# Patient Record
Sex: FEMALE | Race: WHITE | NOT HISPANIC OR LATINO | URBAN - METROPOLITAN AREA
[De-identification: names, ages, dates, MRNs, and addresses within clinical notes are randomized per-mention and may not be internally consistent; named-entity substitution may affect disease eponyms.]

---

## 2019-04-10 ENCOUNTER — INPATIENT (INPATIENT)
Facility: HOSPITAL | Age: 59
LOS: 0 days | Discharge: HOME | DRG: 641 | End: 2019-04-11
Payer: COMMERCIAL

## 2019-04-10 VITALS
RESPIRATION RATE: 18 BRPM | HEART RATE: 88 BPM | WEIGHT: 130.07 LBS | DIASTOLIC BLOOD PRESSURE: 84 MMHG | TEMPERATURE: 98 F | SYSTOLIC BLOOD PRESSURE: 149 MMHG | HEIGHT: 65 IN | OXYGEN SATURATION: 100 %

## 2019-04-10 DIAGNOSIS — R74.0 NONSPECIFIC ELEVATION OF LEVELS OF TRANSAMINASE AND LACTIC ACID DEHYDROGENASE [LDH]: ICD-10-CM

## 2019-04-10 DIAGNOSIS — R63.8 OTHER SYMPTOMS AND SIGNS CONCERNING FOOD AND FLUID INTAKE: ICD-10-CM

## 2019-04-10 DIAGNOSIS — Z98.82 BREAST IMPLANT STATUS: Chronic | ICD-10-CM

## 2019-04-10 DIAGNOSIS — R19.7 DIARRHEA, UNSPECIFIED: ICD-10-CM

## 2019-04-10 DIAGNOSIS — I10 ESSENTIAL (PRIMARY) HYPERTENSION: ICD-10-CM

## 2019-04-10 DIAGNOSIS — L03.115 CELLULITIS OF RIGHT LOWER LIMB: ICD-10-CM

## 2019-04-10 DIAGNOSIS — Z91.89 OTHER SPECIFIED PERSONAL RISK FACTORS, NOT ELSEWHERE CLASSIFIED: ICD-10-CM

## 2019-04-10 DIAGNOSIS — E87.1 HYPO-OSMOLALITY AND HYPONATREMIA: ICD-10-CM

## 2019-04-10 LAB
ANION GAP SERPL CALC-SCNC: 8 MMOL/L — SIGNIFICANT CHANGE UP (ref 5–17)
ANION GAP SERPL CALC-SCNC: 8 MMOL/L — SIGNIFICANT CHANGE UP (ref 5–17)
BUN SERPL-MCNC: 10 MG/DL — SIGNIFICANT CHANGE UP (ref 7–23)
BUN SERPL-MCNC: 13 MG/DL — SIGNIFICANT CHANGE UP (ref 7–23)
CALCIUM SERPL-MCNC: 9.3 MG/DL — SIGNIFICANT CHANGE UP (ref 8.4–10.5)
CALCIUM SERPL-MCNC: 9.3 MG/DL — SIGNIFICANT CHANGE UP (ref 8.4–10.5)
CHLORIDE SERPL-SCNC: 91 MMOL/L — LOW (ref 96–108)
CHLORIDE SERPL-SCNC: 97 MMOL/L — SIGNIFICANT CHANGE UP (ref 96–108)
CO2 SERPL-SCNC: 24 MMOL/L — SIGNIFICANT CHANGE UP (ref 22–31)
CO2 SERPL-SCNC: 26 MMOL/L — SIGNIFICANT CHANGE UP (ref 22–31)
CREAT SERPL-MCNC: 0.76 MG/DL — SIGNIFICANT CHANGE UP (ref 0.5–1.3)
CREAT SERPL-MCNC: 0.8 MG/DL — SIGNIFICANT CHANGE UP (ref 0.5–1.3)
GLUCOSE SERPL-MCNC: 105 MG/DL — HIGH (ref 70–99)
GLUCOSE SERPL-MCNC: 149 MG/DL — HIGH (ref 70–99)
HBA1C BLD-MCNC: 5 % — SIGNIFICANT CHANGE UP (ref 4–5.6)
OSMOLALITY SERPL: 255 MOSM/KG — LOW (ref 280–301)
POTASSIUM SERPL-MCNC: 4 MMOL/L — SIGNIFICANT CHANGE UP (ref 3.5–5.3)
POTASSIUM SERPL-MCNC: 4.3 MMOL/L — SIGNIFICANT CHANGE UP (ref 3.5–5.3)
POTASSIUM SERPL-SCNC: 4 MMOL/L — SIGNIFICANT CHANGE UP (ref 3.5–5.3)
POTASSIUM SERPL-SCNC: 4.3 MMOL/L — SIGNIFICANT CHANGE UP (ref 3.5–5.3)
SODIUM SERPL-SCNC: 125 MMOL/L — LOW (ref 135–145)
SODIUM SERPL-SCNC: 129 MMOL/L — LOW (ref 135–145)
SODIUM UR-SCNC: <20 MMOL/L — SIGNIFICANT CHANGE UP

## 2019-04-10 PROCEDURE — 99223 1ST HOSP IP/OBS HIGH 75: CPT | Mod: GC

## 2019-04-10 PROCEDURE — 93010 ELECTROCARDIOGRAM REPORT: CPT

## 2019-04-10 PROCEDURE — 99284 EMERGENCY DEPT VISIT MOD MDM: CPT | Mod: 25

## 2019-04-10 RX ORDER — TRAZODONE HCL 50 MG
150 TABLET ORAL AT BEDTIME
Qty: 0 | Refills: 0 | Status: DISCONTINUED | OUTPATIENT
Start: 2019-04-10 | End: 2019-04-11

## 2019-04-10 RX ORDER — SODIUM CHLORIDE 9 MG/ML
1000 INJECTION INTRAMUSCULAR; INTRAVENOUS; SUBCUTANEOUS ONCE
Qty: 0 | Refills: 0 | Status: COMPLETED | OUTPATIENT
Start: 2019-04-10 | End: 2019-04-10

## 2019-04-10 RX ORDER — SODIUM CHLORIDE 9 MG/ML
1000 INJECTION INTRAMUSCULAR; INTRAVENOUS; SUBCUTANEOUS
Qty: 0 | Refills: 0 | Status: DISCONTINUED | OUTPATIENT
Start: 2019-04-10 | End: 2019-04-10

## 2019-04-10 RX ORDER — LANOLIN ALCOHOL/MO/W.PET/CERES
3 CREAM (GRAM) TOPICAL AT BEDTIME
Qty: 0 | Refills: 0 | Status: DISCONTINUED | OUTPATIENT
Start: 2019-04-10 | End: 2019-04-11

## 2019-04-10 RX ORDER — SODIUM CHLORIDE 9 MG/ML
1000 INJECTION INTRAMUSCULAR; INTRAVENOUS; SUBCUTANEOUS
Qty: 0 | Refills: 0 | Status: DISCONTINUED | OUTPATIENT
Start: 2019-04-10 | End: 2019-04-11

## 2019-04-10 RX ADMIN — SODIUM CHLORIDE 100 MILLILITER(S): 9 INJECTION INTRAMUSCULAR; INTRAVENOUS; SUBCUTANEOUS at 19:08

## 2019-04-10 RX ADMIN — SODIUM CHLORIDE 1000 MILLILITER(S): 9 INJECTION INTRAMUSCULAR; INTRAVENOUS; SUBCUTANEOUS at 12:22

## 2019-04-10 RX ADMIN — Medication 150 MILLIGRAM(S): at 23:52

## 2019-04-10 RX ADMIN — Medication 1 TABLET(S): at 18:44

## 2019-04-10 NOTE — ED ADULT NURSE NOTE - CHIEF COMPLAINT QUOTE
Patient was sent by PMD for low sodium level , went to PMD yesterday for her rt foot infected toe , had blood work done . Denies any fever nor chills .

## 2019-04-10 NOTE — H&P ADULT - ATTENDING COMMENTS
Pt. seen and examined by me earlier today; I have read Dr. Obrien's H&P, I agree w/ his findings and plan of care as documented; Pt. feels well, reports diarrhea is resolving; she also reports some increased water/fluid intake recently; she denies F/C, N/V, HA, lethargy, dizziness, confusion; VSS; Pt. well-appearing on exam, MMM, no JVD, skin WWP, R foot w/ area of erythema and edema, non-focal neuro exam; labs reviewed; Na 122 --> 125 (s/p NS), Sosm 255   (1) Hyponatremia -- asymptomatic; hypo-osmolar, hypovolemic (likely d/t diarrhea, in setting of increased water intake); Na improving w/ NS; monitor BMP; Renal consult if worsening or if over-corrected  (2) R foot cellulitis -- infected bunion, per Pt.; cont. TMP-SMX for now, elevate foot, Podiatry consult in AM  (3) HTN -- cont. low-salt diet, can liberalize salt intake if hyponatremia not improving  (4) Diarrhea -- possibly antibiotic-associated; resolving, per Pt.; monitor for now, but will pursue further work-up if worsening, may need alternative abx if worsening   (5) Transaminitis -- possibly d/t infection and/or TMP-SMX; monitor LFTs, check viral hepatitis panel; may need alternative abx if worsening  Dispo: home pending improved [Na]

## 2019-04-10 NOTE — ED ADULT NURSE NOTE - CHPI ED NUR SYMPTOMS NEG
no chills/no vomiting/no decreased eating/drinking/no nausea/no fever/no headache/no loss of consciousness/no dizziness/no back pain

## 2019-04-10 NOTE — H&P ADULT - NSHPPHYSICALEXAM_GEN_ALL_CORE
GENERAL: Well-developed, well-nourished. No acute distress. Appears stated age.  HEENT:  Atraumatic, Normocephalic,  extraocular eye movements intact, PERRLA, conjunctiva and sclera clear, oropharynx clear without exudates or erythema  NECK: Supple, No JVD, no LAD  CHEST: no gross deformities   LUNG: Clear to auscultation bilaterally; No wheezing, rales, rhonchi, or stridor  HEART: Regular rate and rhythm; S1 and S2 audible; No murmurs, rubs, or gallops  ABDOMEN: Soft, Nontender, Nondistended; Bowel sounds present in all four quadrants; no hepatosplenomegaly  EXTREMITIES:  2+ Peripheral Pulses bilaterally, No clubbing, cyanosis, or edema  NEUROLOGY: awake, alert, oriented x3; no focal deficits   SKIN: No rashes or lesions GENERAL: Well-developed, well-nourished. No acute distress. Appears stated age.  HEENT:  EOMI, PERRLA, conjunctiva and sclera clear, oropharynx clear, dry mucous membranes  NECK: no JVD, no LAD  LUNG: Clear to auscultation bilaterally; No wheezing, rales, rhonchi, or stridor  HEART: Regular rate and rhythm; S1 and S2 audible; No murmurs, rubs, or gallops  ABDOMEN: soft, NTND; BS+  EXTREMITIES:  2+ Peripheral Pulses bilaterally. On right foot, ~5x5cm area of erythema surrounding 1st MTP joint, warm to touch, very mild tenderness to palpation. No area of fluctuance underneath. Able to move toes without restricted ROM. GENERAL: Well-developed, well-nourished. No acute distress. Appears stated age.  HEENT:  EOMI, PERRLA, conjunctiva and sclera clear, oropharynx clear, dry mucous membranes  NECK: no JVD, no LAD  LUNG: Clear to auscultation bilaterally; No wheezing, rales, rhonchi, or stridor  HEART: Regular rate and rhythm; S1 and S2 audible; No murmurs, rubs, or gallops  ABDOMEN: soft, NT, ND; BS+ in 4 quadrants  EXTREMITIES:  2+ Peripheral Pulses bilaterally. On right foot, ~5x5cm area of swelling and erythema on medial side just proximal 1st MTP joint, warm to touch, very mild tenderness to palpation. No area of fluctuance underneath. No open wound and no purulent drainage. Able to move toes without restricted ROM.

## 2019-04-10 NOTE — H&P ADULT - PROBLEM SELECTOR PLAN 1
Pt is a 59 y/o woman with PMHx of HTN, anxiety, and infected bunion on R foot who was sent in by her PCP for asymptomatic hyponatremia in the context of diarrhea after recent antibiotic use. PE showed dry mucous membranes. /84. Labs significant for hyponatremia 122, hypoK 3.4, hypoCl 82, serum Osm 255. Urine osm 189, urine Na < 20. Likely hypovolemic hyponatremia due to GI losses. S/p 500mL NS in the ED with improvement of Na to 125. Currently asymptomatic, hemodynamically stable.  - Continue with IVF  - Trend Na levels  - Replete electrolytes PRN Na 122, serum Osm 255. Urine osm 189, urine Na < 20. Studies and exam consistent with hypovolemic hypotonic hyponatremia due to GI losses from recent diarrhea. Unlikely from SIADH or polydipsia.  S/p 500mL NS in the ED with improvement of Na to 125.  Asymptomatic on admission.  - Continue with IV NS @100 mL/hr  - Trend BMPs   -Goal correction rate: 8-12 mEq in 24 hrs.  - Replete electrolytes PRN Na 122, serum Osm 255. Urine osm 189, urine Na < 20. Studies and exam consistent with hypovolemic hypo-osmolar hyponatremia due to GI losses from recent diarrhea. Unlikely from SIADH or polydipsia.  S/p 500mL NS in the ED with improvement of Na to 125.  Asymptomatic on admission.  - Continue with IV NS @100 mL/hr  - Trend BMPs   -Goal correction rate: 8-12 mEq in 24 hrs.  - Replete electrolytes PRN  - Renal consult if worsening or significant overcorrection on fluids

## 2019-04-10 NOTE — ED PROVIDER NOTE - OBJECTIVE STATEMENT
59 y/o f sent to ED for hyponatremia found as oupt.  As per pt she is currently being treated for right 1st toe infection on bactrim for two days.  Pt called this morning after labs revealed a hyponatremia.  Pt states she had diarrhea infection followed by drinking a lot of water for past two days.  She describes multiple 2L bottles of water per day.  Denies nausea, vomiting, confusion.  Pt has had hyponatremia in past when on HCTZ but has no longer been on this medication.

## 2019-04-10 NOTE — H&P ADULT - PROBLEM SELECTOR PLAN 4
Pt with hx of HTN, on metoprolol at home (unsure of dose). On admission, / 84.  - Hold home metoprolol for now On admission, labs significant for elevated , , Alk phos and Tbili WNL. Denies any illicit drug use, and only social alcohol. No history of liver disease. She has been taking Bactrim prior to admission which may rarely lead to liver injury.  - Obtain acute hepatitis panel  - Monitor AST, ALT with daily labs; if rising may need to d/c Bactrim On admission, labs significant for elevated , , Alk phos and Tbili WNL. Denies any illicit drug use, and only social alcohol. No history of liver disease. She has been taking Bactrim prior to admission which may rarely lead to liver injury.  - Obtain acute hepatitis panel  - Monitor AST, ALT with daily labs off Bactirm

## 2019-04-10 NOTE — ED PROVIDER NOTE - CLINICAL SUMMARY MEDICAL DECISION MAKING FREE TEXT BOX
Pt with right lower ext first toe redness - found to have hyponatremia - ? hypovolemia vs polydypsia. Pt with improvement after bolus of 500cc NS.  Pt to be admitted to medicine for further mgmt of hyponatremia until correction and also to discern underlying cause.

## 2019-04-10 NOTE — ED ADULT NURSE NOTE - OBJECTIVE STATEMENT
Patient alert and oriented x 3 was sent by PMD for low sodium level . Pt . went to PMD yesterday due to rt foot infection for few days and had blood work done . Today PMD called her to come to ER for low na level and she can have possible seizure . having rt foot pain at this time .

## 2019-04-10 NOTE — H&P ADULT - PROBLEM SELECTOR PLAN 5
Pt with hx of HTN, on metoprolol at home (unsure of dose). On admission, / 84.  - Hold home metoprolol for now as patient normotensive

## 2019-04-10 NOTE — H&P ADULT - NSHPSOCIALHISTORY_GEN_ALL_CORE
Recently moved to [place], employed, lives in an apartment. Does not smoke or use illicit drugs. States social ETOH use on weekends. Recently moved to [place], employed, lives in an apartment.     Denies smoking or illicit drug use. States social ETOH use on weekends.

## 2019-04-10 NOTE — H&P ADULT - NSHPLABSRESULTS_GEN_ALL_CORE
12.2   9.45  )-----------( 173      ( 10 Apr 2019 11:23 )             34.8     04-10    125<L>  |  91<L>  |  10  ----------------------------<  149<H>  4.0   |  26  |  0.76    Ca    9.3      10 Apr 2019 13:41    TPro  7.7  /  Alb  4.9  /  TBili  0.8  /  DBili  x   /  AST  127<H>  /  ALT  109<H>  /  AlkPhos  75  04-10

## 2019-04-10 NOTE — H&P ADULT - ASSESSMENT
Pt is a 59 y/o woman with PMHx of HTN, anxiety, and infected bunion on R foot who was sent in by her PCP for asymptomatic hyponatremia in the context of diarrhea after recent antibiotic use. Labs significant for hyponatremia 122 (improvement to 125 s/p 500mL NS), hypoK 3.4, hypoCl 82, serum Osm 255. Urine osm 189, urine Na < 20. Likely hypovolemic hyponatremia due to GI losses. 59 y/o female with HTN, anxiety, and infected bunion on R foot who was sent in by outpatient internist for hyponatremia the in setting of diarrhea after recent antibiotic use. Found to hyponatremic to 122, but asymptomatic on admission. Studies and exam suggesting hypovolemic hypotonic hyponatremia due to GI losses. 59 y/o female with HTN, anxiety, and infected bunion on R foot who was sent in by outpatient internist for hyponatremia the in setting of diarrhea after recent antibiotic use. Found to hyponatremic to 122, but asymptomatic on admission. Studies and exam suggesting hypovolemic hypo-osmolar hyponatremia due to GI losses.

## 2019-04-10 NOTE — H&P ADULT - PROBLEM SELECTOR PLAN 6
F: IV NS @ 100 mL/hr; adjust rate based on Na correction  E: monitor and correct Na as above  N: DASH/TLC diet     Code: full  DVT ppx: low risk, no pharmacologic prophylaxis needed  GI ppx: not needed  Dispo: [facility]

## 2019-04-10 NOTE — H&P ADULT - PROBLEM SELECTOR PLAN 3
On admission, labs significant for elevated , , normal alk phos. No history of IVDU, social ETOH use. Unaware of any hx of hepatitis. She has been taking Bactrim prior to admission, which has been noted to cause elevated transaminases too.  - Obtain hepatitis panel  - Monitor AST, ALT Likely due to antibiotic side effect, and less likely infectious given patient afebrile and no leukocytosis.   -Continue to monitor diarrhea with Is and Os. If worsening, will need to d/c Bactrim and pursue infectious diarrhea work-up. Likely due to antibiotic side effect, and less likely infectious given patient afebrile and no leukocytosis.   -Continue to monitor diarrhea with Is and Os. If worsening, will need to adjust antibiotics pursue infectious diarrhea work-up.

## 2019-04-10 NOTE — H&P ADULT - HISTORY OF PRESENT ILLNESS
Pt is a 59 y/o woman with PMHx of HTN, anxiety, and infected bunion on R foot who was sent in by her PCP for asymptomatic hyponatremia a/w diarrhea after antibiotic use. Pt states that the bunion on her R foot had been bothering her since Sept 2018, saw multiple podiatrists, and had been receiving cortisone shots. Her most recent cortisone shot was this Feb, after which she noticed the area of injection appeared slightly more red, swollen and painful. She attributed the redness to a sunburn after a trip to [place], but felt that the area continued to become more red and painful with some pus drainage. She went to urgent care on March 22nd given worsening symptoms, and was prescribed an antibiotic (does not recall name) for likely cellulitis. She was on it for 2-3 days but developed diarrhea, so she was then switched to Bactrim. She developed diarrhea again so she discontinued the antibiotic and returned to urgent care on 4/8. Stools have been primarily brown liquid, occurs whenever she goes to urinate. Since she began having diarrhea she has been increasing her water intake as well, drinking multiple bottles of water, juices, and sodas per day. Urinary frequency increased as well, but denies any dysuria. She was subsequently referred by urgent care to a podiatrist and internist for further management. Lab work done showed she was hyponatremic, so she was subsequently sent to the ED today. Pt denies any fevers or chills, HA and visual changes, N/V, SOB, CP, abdominal pain.     In the ED, VS T 36.9, , /84, RR 16, 97% on RA. Lab work significant for Na 122, K 3.4, Cl 82, serum Osm 255. Urine studies ordered, osm 189, urine Na < 20. Received 500mL NS and admitted for further management. 59 y/o female with PMHx of HTN, anxiety, and infected bunion on R foot who was sent in by her PCP for asymptomatic hyponatremia a/w diarrhea after antibiotic use. Pt states that the bunion on her R foot had been bothering her since Sept 2018, saw multiple podiatrists, and had been receiving cortisone shots. Her most recent cortisone shot was this Feb, after which she noticed the area of injection appeared slightly more red, swollen and painful. She attributed the redness to a sunburn after a trip to [place], but felt that the area continued to become more red and painful with some pus drainage. She went to urgent care on March 22nd given worsening symptoms, and was prescribed an antibiotic (does not recall name) for likely cellulitis. She was on it for 2-3 days but developed diarrhea, so she was then switched to Bactrim. She developed diarrhea again so she discontinued the antibiotic and returned to urgent care on 4/8. Stools have been primarily brown liquid, occurs whenever she goes to urinate. Since she began having diarrhea she has been increasing her water intake as well, drinking multiple bottles of water, juices, and sodas per day. Urinary frequency increased as well, but denies any dysuria. She was subsequently referred by urgent care to a podiatrist and internist for further management. Lab work done showed she was hyponatremic, so she was subsequently sent to the ED today. Pt denies any fevers or chills, HA and visual changes, N/V, SOB, CP, abdominal pain.     In the ED, VS T 36.9, , /84, RR 16, 97% on RA. Lab work significant for Na 122, K 3.4, Cl 82, serum Osm 255. Urine studies ordered, osm 189, urine Na < 20. Received 500mL NS and admitted for further management. 57 y/o female with HTN, anxiety, and infected bunion on R foot who was sent in by outpatient internist for hyponatremia.  Pt states that the bunion on her R foot had been bothering her since Sept 2018, saw multiple podiatrists, and had been receiving cortisone shots. Her most recent cortisone shot was this Feb, after which she noticed the area of injection appeared slightly more red, swollen and painful. She attributed the redness to a sunburn after a trip to [place], but felt that the area continued to become more red and painful with some pus drainage. She went to urgent care on March 22nd given worsening symptoms, and was prescribed an antibiotic (does not recall name) for likely cellulitis. She was on it for 2-3 days but developed severe diarrhea, so she was then switched to Bactrim DS BID. In total she has taken about 5 doses. She developed diarrhea again so she discontinued the antibiotics and returned to urgent care on 4/8. Stools have been primarily brown liquid, occurs whenever she goes to urinate, and not foul-smelling. Since she began having diarrhea she has been increasing her water intake as well, drinking multiple bottles of water, juices, and sodas for total of about 4-6 bottles per day. Urinary frequency increased as well, but denies any dysuria. She was subsequently referred by urgent care to a podiatrist and internist for further management. Lab work done showed she was hyponatremic, so she was subsequently sent to the ED today. Pt denies any fevers or chills, HA and visual changes, N/V, SOB, CP, abdominal pain.     In the ED, VS T 36.7, HR 88, /84, RR 18, SpO2 100 % on RA. Lab work significant for Na 122, K 3.4, Cl 82, serum Osm 255. Urine studies ordered, osm 189, urine Na < 20. Received 500mL NS bolus with correction of Na to 155. EKG with NSR, no acute ST abnormalities.

## 2019-04-10 NOTE — H&P ADULT - PROBLEM SELECTOR PLAN 2
Pt is a 57 y/o woman with PMHx of HTN, anxiety, and infected bunion on R foot who was evaluated by outpatient podiatry and urgent care. Started on antibiotics as an outpatient, subsequently developing diarrhea. Switched to Bactrim, with continued diarrhea and GI upset. Area of erythema on R foot, no fluctuance, likely cellulitis. Diarrhea is likely side effect from antibiotic use, unlikely primary infectious cause since she is afebrile, no leukocytosis.   - Continue Bactrim, given some improvement in erythema since starting it as an outpatient  - Start Imodium for symptomatic management of diarrhea Patient reporting purulent drainage from right foot several weeks ago, so increased risk for MRSA.  Patient recently switched to Bactrim DS BID by urgent care for right foot cellulitis, with worsening diarrhea and GI upset. Diarrhea is likely side effect from antibiotic use, unlikely primary infectious as patient with no signs of systemic infection (afebrile, no leukocytosis).  - Continue Bactrim DS Q12H, given some improvement in erythema since starting it as an outpatient. However, monitor I&O on Bactrim for increased diarrhea. If worsening, may need to switch to IV antibiotics.   -Podiatry consult in AM for further evaluation. Patient reporting purulent drainage from right foot several weeks ago, so increased risk for MRSA.  Patient recently switched to Bactrim DS BID by urgent care for right foot cellulitis, with worsening diarrhea and GI upset. Diarrhea is likely side effect from antibiotic use, unlikely primary infectious as patient with no signs of systemic infection (afebrile, no leukocytosis).  s/p Bactrim DS x 1 in ED.  -Switch to Doxycycline 100 mg BID, given that Bactrim may lead to hyponatremia and can rarely cause transaminitis.   Monitor for diarrhea while on doxycycline. If worsening, may need to switch to IV antibiotics.   -Podiatry consult in AM for further evaluation.

## 2019-04-11 VITALS
HEART RATE: 80 BPM | OXYGEN SATURATION: 98 % | DIASTOLIC BLOOD PRESSURE: 75 MMHG | TEMPERATURE: 98 F | SYSTOLIC BLOOD PRESSURE: 118 MMHG | RESPIRATION RATE: 18 BRPM

## 2019-04-11 DIAGNOSIS — Z91.89 OTHER SPECIFIED PERSONAL RISK FACTORS, NOT ELSEWHERE CLASSIFIED: ICD-10-CM

## 2019-04-11 LAB
ALBUMIN SERPL ELPH-MCNC: 4.1 G/DL — SIGNIFICANT CHANGE UP (ref 3.3–5)
ALP SERPL-CCNC: 63 U/L — SIGNIFICANT CHANGE UP (ref 40–120)
ALT FLD-CCNC: 97 U/L — HIGH (ref 10–45)
ANION GAP SERPL CALC-SCNC: 6 MMOL/L — SIGNIFICANT CHANGE UP (ref 5–17)
AST SERPL-CCNC: 99 U/L — HIGH (ref 10–40)
BILIRUB SERPL-MCNC: 0.7 MG/DL — SIGNIFICANT CHANGE UP (ref 0.2–1.2)
BUN SERPL-MCNC: 11 MG/DL — SIGNIFICANT CHANGE UP (ref 7–23)
CALCIUM SERPL-MCNC: 9.3 MG/DL — SIGNIFICANT CHANGE UP (ref 8.4–10.5)
CHLORIDE SERPL-SCNC: 101 MMOL/L — SIGNIFICANT CHANGE UP (ref 96–108)
CO2 SERPL-SCNC: 24 MMOL/L — SIGNIFICANT CHANGE UP (ref 22–31)
CREAT SERPL-MCNC: 0.78 MG/DL — SIGNIFICANT CHANGE UP (ref 0.5–1.3)
GLUCOSE SERPL-MCNC: 104 MG/DL — HIGH (ref 70–99)
HAV IGM SER-ACNC: SIGNIFICANT CHANGE UP
HBV CORE IGM SER-ACNC: SIGNIFICANT CHANGE UP
HBV SURFACE AG SER-ACNC: SIGNIFICANT CHANGE UP
HCT VFR BLD CALC: 33.3 % — LOW (ref 34.5–45)
HCV AB S/CO SERPL IA: 0.07 S/CO — SIGNIFICANT CHANGE UP
HCV AB SERPL-IMP: SIGNIFICANT CHANGE UP
HGB BLD-MCNC: 11.4 G/DL — LOW (ref 11.5–15.5)
MAGNESIUM SERPL-MCNC: 1.9 MG/DL — SIGNIFICANT CHANGE UP (ref 1.6–2.6)
MCHC RBC-ENTMCNC: 34.2 GM/DL — SIGNIFICANT CHANGE UP (ref 32–36)
MCHC RBC-ENTMCNC: 34.4 PG — HIGH (ref 27–34)
MCV RBC AUTO: 100.6 FL — HIGH (ref 80–100)
NRBC # BLD: 0 /100 WBCS — SIGNIFICANT CHANGE UP (ref 0–0)
PHOSPHATE SERPL-MCNC: 3.9 MG/DL — SIGNIFICANT CHANGE UP (ref 2.5–4.5)
PLATELET # BLD AUTO: 147 K/UL — LOW (ref 150–400)
POTASSIUM SERPL-MCNC: 4.1 MMOL/L — SIGNIFICANT CHANGE UP (ref 3.5–5.3)
POTASSIUM SERPL-SCNC: 4.1 MMOL/L — SIGNIFICANT CHANGE UP (ref 3.5–5.3)
PROT SERPL-MCNC: 6.6 G/DL — SIGNIFICANT CHANGE UP (ref 6–8.3)
RBC # BLD: 3.31 M/UL — LOW (ref 3.8–5.2)
RBC # FLD: 12.9 % — SIGNIFICANT CHANGE UP (ref 10.3–14.5)
SODIUM SERPL-SCNC: 131 MMOL/L — LOW (ref 135–145)
TSH SERPL-MCNC: 2.86 UIU/ML — SIGNIFICANT CHANGE UP (ref 0.35–4.94)
WBC # BLD: 3.86 K/UL — SIGNIFICANT CHANGE UP (ref 3.8–10.5)
WBC # FLD AUTO: 3.86 K/UL — SIGNIFICANT CHANGE UP (ref 3.8–10.5)

## 2019-04-11 PROCEDURE — 84100 ASSAY OF PHOSPHORUS: CPT

## 2019-04-11 PROCEDURE — 36415 COLL VENOUS BLD VENIPUNCTURE: CPT

## 2019-04-11 PROCEDURE — 80053 COMPREHEN METABOLIC PANEL: CPT

## 2019-04-11 PROCEDURE — 99285 EMERGENCY DEPT VISIT HI MDM: CPT | Mod: 25

## 2019-04-11 PROCEDURE — 83930 ASSAY OF BLOOD OSMOLALITY: CPT

## 2019-04-11 PROCEDURE — 80074 ACUTE HEPATITIS PANEL: CPT

## 2019-04-11 PROCEDURE — 80048 BASIC METABOLIC PNL TOTAL CA: CPT

## 2019-04-11 PROCEDURE — 82570 ASSAY OF URINE CREATININE: CPT

## 2019-04-11 PROCEDURE — 84300 ASSAY OF URINE SODIUM: CPT

## 2019-04-11 PROCEDURE — 85730 THROMBOPLASTIN TIME PARTIAL: CPT

## 2019-04-11 PROCEDURE — 99239 HOSP IP/OBS DSCHRG MGMT >30: CPT

## 2019-04-11 PROCEDURE — 83935 ASSAY OF URINE OSMOLALITY: CPT

## 2019-04-11 PROCEDURE — 84443 ASSAY THYROID STIM HORMONE: CPT

## 2019-04-11 PROCEDURE — 85027 COMPLETE CBC AUTOMATED: CPT

## 2019-04-11 PROCEDURE — 81001 URINALYSIS AUTO W/SCOPE: CPT

## 2019-04-11 PROCEDURE — 85610 PROTHROMBIN TIME: CPT

## 2019-04-11 PROCEDURE — 85025 COMPLETE CBC W/AUTO DIFF WBC: CPT

## 2019-04-11 PROCEDURE — 93005 ELECTROCARDIOGRAM TRACING: CPT

## 2019-04-11 PROCEDURE — 83036 HEMOGLOBIN GLYCOSYLATED A1C: CPT

## 2019-04-11 PROCEDURE — 83735 ASSAY OF MAGNESIUM: CPT

## 2019-04-11 RX ORDER — AZTREONAM 2 G
1 VIAL (EA) INJECTION
Qty: 0 | Refills: 0 | COMMUNITY

## 2019-04-11 RX ORDER — SODIUM CHLORIDE 9 MG/ML
1000 INJECTION INTRAMUSCULAR; INTRAVENOUS; SUBCUTANEOUS
Qty: 0 | Refills: 0 | Status: DISCONTINUED | OUTPATIENT
Start: 2019-04-11 | End: 2019-04-11

## 2019-04-11 RX ORDER — MAGNESIUM SULFATE 500 MG/ML
2 VIAL (ML) INJECTION ONCE
Qty: 0 | Refills: 0 | Status: COMPLETED | OUTPATIENT
Start: 2019-04-11 | End: 2019-04-11

## 2019-04-11 RX ADMIN — SODIUM CHLORIDE 50 MILLILITER(S): 9 INJECTION INTRAMUSCULAR; INTRAVENOUS; SUBCUTANEOUS at 09:17

## 2019-04-11 RX ADMIN — SODIUM CHLORIDE 50 MILLILITER(S): 9 INJECTION INTRAMUSCULAR; INTRAVENOUS; SUBCUTANEOUS at 07:08

## 2019-04-11 RX ADMIN — SODIUM CHLORIDE 50 MILLILITER(S): 9 INJECTION INTRAMUSCULAR; INTRAVENOUS; SUBCUTANEOUS at 13:32

## 2019-04-11 RX ADMIN — Medication 100 MILLIGRAM(S): at 07:08

## 2019-04-11 RX ADMIN — Medication 50 GRAM(S): at 07:48

## 2019-04-11 NOTE — DISCHARGE NOTE PROVIDER - HOSPITAL COURSE
Pt is a 57yo F with PMH of HTN and anxiety admitted for hyponatremia and R foot cellulitis. Pt was seen at urgent care 3 weeks ago for pain and redness on medial edge of R foot/first MTP. Was given unknown abx for R foot cellulitis, subsequently developed diarrhea and was changed to PO Bactrim following another urgent care visit. Diarrhea persisted and labs at PMD showed hyponatremia and pt was referred to [facility] ED. At admission, pt was afebrile and without acute complaint. Admission labs with hyponatremia Na 122, serum Osm 255, AST//109. Pt given 500ml NS bolus with 50ml/hr maintenance NS with subsequent improvement of serum Na. Abx changed from Bactrim to PO doxycycline. Pt had no episodes of loose stool during admission. At time of discharge, pt denied any acute complaints, with most recent Na 131. R foot cellulitis appeared improved with mild residual erythema and tenderness; slight fluctuance noted with bedside US negative for fluid collection. Transaminitis still present at discharge with AST/ALT 99/97; likely due to hypovolemia vs bactrim adverse reaction. Pt for discharge to home on PO doxycycline 100mg BID for 8 days with PMD followup to follow sodium level and resolution of transaminitis. Pt is a 57yo F with PMH of HTN and anxiety admitted for hyponatremia and R foot cellulitis. Pt was seen at urgent care 3 weeks ago for pain and redness on medial edge of R foot/first MTP. Was given unknown abx for R foot cellulitis, subsequently developed diarrhea and was changed to PO Bactrim following another urgent care visit. Diarrhea persisted and labs at PMD showed hyponatremia and pt was referred to [facility] ED. At admission, pt was afebrile and without acute complaint. Admission labs with hyponatremia Na 122, serum Osm 255, AST//109. Pt given 500ml NS bolus with 50ml/hr maintenance NS with subsequent improvement of serum Na. Abx changed from Bactrim to PO doxycycline. Pt had no episodes of loose stool during admission. At time of discharge, pt denied any acute complaints, with most recent Na 131. R foot cellulitis appeared improved with mild residual erythema and tenderness; slight fluctuance noted, with bedside US negative for any significant fluid collection. Transaminitis still present at discharge with AST/ALT 99/97; likely due to hypovolemia vs bactrim adverse reaction. Pt for discharge to home on PO doxycycline 100mg BID for 8 days with PMD followup to follow sodium level and resolution of transaminitis. Pt is a 59yo F with PMH of HTN and anxiety admitted for hyponatremia and R foot cellulitis. Pt was seen at urgent care 3 weeks ago for pain and redness on medial edge of R foot/first MTP. Was given unknown abx for R foot cellulitis, subsequently developed diarrhea and was changed to PO Bactrim following another urgent care visit. Diarrhea persisted and labs at PMD showed hyponatremia and pt was referred to [facility] ED. At admission, pt was afebrile and without acute complaint. Admission labs with hyponatremia Na 122, serum Osm 255, AST//109. Pt given 500ml NS bolus with 50ml/hr maintenance NS with subsequent improvement of serum Na. Abx changed from Bactrim to PO doxycycline. Pt had no episodes of loose stool during admission. At time of discharge, pt denied any acute complaints, with most recent Na 131. R foot cellulitis appeared improved with mild residual erythema and tenderness; slight fluctuance noted, with bedside US negative for any significant fluid collection. Transaminitis still present at discharge with AST/ALT 99/97; likely due to hypovolemia vs bactrim adverse reaction. Pt stable for discharge to home on PO doxycycline 100mg BID for 8 more days to complete a 10 day course of antibx with PMD follow up on 4/15 at 10:45 to follow sodium level and resolution of transaminitis.

## 2019-04-11 NOTE — DISCHARGE NOTE NURSING/CASE MANAGEMENT/SOCIAL WORK - NSDCDPATPORTLINK_GEN_ALL_CORE
You can access the [facility] Portal, offered by [facility], by registering with the following website: [email or web address]

## 2019-04-11 NOTE — DISCHARGE NOTE PROVIDER - PROVIDER TOKENS
FREE:[LAST:[Dary],FIRST:[Daly],PHONE:[phone]],FAX:[phone]   [phone]    -],ADDRESS:[[address] [place] [address]],FOLLOWUP:[1 week]]

## 2019-04-11 NOTE — DISCHARGE NOTE PROVIDER - NSDCFUADDAPPT_GEN_ALL_CORE_FT
Appointment scheduled with PMD Dr. Foote for Monday 4/15 at 10:45am. Appointment scheduled with PMD Dr. Daly Foote for Monday 4/15 at 10:45am. Appointment scheduled with PMD Dr. Daly Foote for Monday 4/15 at 10:45am.    If you notice that your symptoms are worsening or not improving, please follow up with your primary care physician or come back to the ED.

## 2019-04-11 NOTE — DISCHARGE NOTE NURSING/CASE MANAGEMENT/SOCIAL WORK - NSDCFUADDAPPT_GEN_ALL_CORE_FT
Appointment scheduled with PMD Dr. Daly Foote for Monday 4/15 at 10:45am.    If you notice that your symptoms are worsening or not improving, please follow up with your primary care physician or come back to the ED.

## 2019-04-11 NOTE — DISCHARGE NOTE PROVIDER - CARE PROVIDER_API CALL
Daly Foote  [address] [place] [address] [address]  [place] [address]  Phone: [phone]  Fax: (   )    -  Follow Up Time: 1 week

## 2019-04-11 NOTE — DISCHARGE NOTE PROVIDER - INSTRUCTIONS
Please maintain a well balanced diet. Gatorade or broths are better for repletion of electrolytes than cups of water in the setting of low sodium.

## 2019-04-11 NOTE — DISCHARGE NOTE PROVIDER - NSDCCPCAREPLAN_GEN_ALL_CORE_FT
PRINCIPAL DISCHARGE DIAGNOSIS  Diagnosis: Hyponatremia  Assessment and Plan of Treatment: You were admitted to the hospital for hyponatremia. Hyponatremia occurs when the concentration of sodium in your blood is abnormally low. Sodium is an electrolyte, and it helps regulate the amount of water that's in and around your cells.  In hyponatremia, one or more factors — ranging from an underlying medical condition to drinking too much water — cause the sodium in your body to become diluted. When this happens, your body's water levels rise, and your cells begin to swell. This swelling can cause many health problems, from mild to life-threatening.  Your hyponatremia was likely due to the repeated episodes of loose stool that you have been having. You received IV fluids while in the hospital and your sodium level improved. Make sure to follow up with your primary care doctor to recheck your sodium level next week.      SECONDARY DISCHARGE DIAGNOSES  Diagnosis: Cellulitis of foot, right  Assessment and Plan of Treatment: Cellulitis is a common, potentially serious bacterial skin infection. The affected skin appears swollen and red and is typically painful and warm to the touch.  Your cellulitis has improved with the antibiotics you have been receiving. You are being discharged on an antibiotic, doxycycline, to continue treating the infection. There was some fluctuance in the are of the cellulitis on your R foot; ultrasound did not show any significant/drainable fluid collection. Make sure to finish the full course of antibiotics and to follow up with your primary care doctor.    Diagnosis: Transaminitis  Assessment and Plan of Treatment: Your labs in the hospital showed an elevation in some of your liver enzymes. This could be a result of dehydration from diarrhea or a side effect of Bactrim, the previous antibiotic you were taking. The transaminase levels decreased while you were in the hospital and your hepatitis panel was negative. It is important to follow up with your primary care doctor for any further workup.    Diagnosis: Diarrhea  Assessment and Plan of Treatment: You had multiple episodes of loose stool prior to coming to the hospital. This is most likely why your sodium levels were low. Antibiotics are a common cause of diarrhea. Make sure to maintain your fluid intake after you leave the hospital. If you have more episodes of diarrhea, contact your primary care doctor for recommendations after discharge.    Diagnosis: Hypertension, unspecified type  Assessment and Plan of Treatment: Your blood pressure was normal while you were in the hospital. Continue taking your home metoprolol once you are discharged home. PRINCIPAL DISCHARGE DIAGNOSIS  Diagnosis: Hyponatremia  Assessment and Plan of Treatment: You were admitted to the hospital for hyponatremia. Hyponatremia occurs when the concentration of sodium in your blood is abnormally low. Sodium is an electrolyte, and it helps regulate the amount of water that's in and around your cells.  In hyponatremia, one or more factors — ranging from an underlying medical condition to drinking too much water — cause the sodium in your body to become diluted. When this happens, your body's water levels rise, and your cells begin to swell. This swelling can cause many health problems, from mild to life-threatening.  Your hyponatremia was likely due to the repeated episodes of loose stool that you have been having. You received IV fluids while in the hospital and your sodium level improved. Make sure to follow up with your primary care doctor to recheck your sodium level next week.      SECONDARY DISCHARGE DIAGNOSES  Diagnosis: Hypertension, unspecified type  Assessment and Plan of Treatment: Your blood pressure was normal while you were in the hospital. Continue taking your home metoprolol once you are discharged home.    Diagnosis: Cellulitis of foot, right  Assessment and Plan of Treatment: Cellulitis is a common, potentially serious bacterial skin infection. The affected skin appears swollen and red and is typically painful and warm to the touch.  Your cellulitis has improved with the antibiotics you have been receiving. You are being discharged on an antibiotic, doxycycline, to continue treating the infection. Please take one 100mg tablet two times per day for another 8 days. Your first dose of Doxycycline will be tonight at 6PM. There was some fluctuance in the are of the cellulitis on your Right foot; ultrasound did not show any significant/drainable fluid collection. Make sure to finish the full course of antibiotics and to follow up with your primary care doctor.    Diagnosis: Transaminitis  Assessment and Plan of Treatment: Your labs in the hospital showed an elevation in some of your liver enzymes. This could be a result of dehydration from diarrhea or a side effect of Bactrim, the previous antibiotic you were taking. The transaminase levels decreased while you were in the hospital and your hepatitis panel was negative. It is important to follow up with your primary care doctor for any further workup.    Diagnosis: Diarrhea  Assessment and Plan of Treatment: You had multiple episodes of loose stool prior to coming to the hospital. This is most likely why your sodium levels were low. Antibiotics are a common cause of diarrhea. Make sure to maintain your fluid intake after you leave the hospital. Gatorade and broths are better than cups of water for electrolyte repletion. If you have more episodes of diarrhea, contact your primary care doctor for recommendations after discharge.    Diagnosis: Transition of care performed with sharing of clinical summary  Assessment and Plan of Treatment: 1) PCP Contacted on Admission: (Y/N) --> Name & Phone #: Dr. Daly Foote  2) Date of Contact with PCP:  3) PCP Contacted at Discharge: (Y/N, N/A)  4) Summary of Handoff Given to PCP:   5) Post-Discharge Appointment Date and Location: 4/15/19 at 10:45 AM

## 2019-04-16 DIAGNOSIS — K52.1 TOXIC GASTROENTERITIS AND COLITIS: ICD-10-CM
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
## 2019-04-16 DIAGNOSIS — R74.0 NONSPECIFIC ELEVATION OF LEVELS OF TRANSAMINASE AND LACTIC ACID DEHYDROGENASE [LDH]: ICD-10-CM

## 2019-04-16 DIAGNOSIS — I10 ESSENTIAL (PRIMARY) HYPERTENSION: ICD-10-CM

## 2019-04-16 DIAGNOSIS — E87.1 HYPO-OSMOLALITY AND HYPONATREMIA: ICD-10-CM

## 2019-04-16 DIAGNOSIS — T36.95XA ADVERSE EFFECT OF UNSPECIFIED SYSTEMIC ANTIBIOTIC, INITIAL ENCOUNTER: ICD-10-CM

## 2019-04-16 DIAGNOSIS — F41.9 ANXIETY DISORDER, UNSPECIFIED: ICD-10-CM

## 2019-04-16 DIAGNOSIS — L03.115 CELLULITIS OF RIGHT LOWER LIMB: ICD-10-CM

## 2019-05-01 ENCOUNTER — INPATIENT (INPATIENT)
Facility: HOSPITAL | Age: 59
LOS: 1 days | Discharge: HOME | DRG: 872 | End: 2019-05-03
Attending: INTERNAL MEDICINE | Admitting: INTERNAL MEDICINE
Payer: COMMERCIAL

## 2019-05-01 VITALS
WEIGHT: 142.42 LBS | HEART RATE: 99 BPM | RESPIRATION RATE: 18 BRPM | TEMPERATURE: 99 F | DIASTOLIC BLOOD PRESSURE: 69 MMHG | SYSTOLIC BLOOD PRESSURE: 112 MMHG | OXYGEN SATURATION: 98 %

## 2019-05-01 DIAGNOSIS — Z91.89 OTHER SPECIFIED PERSONAL RISK FACTORS, NOT ELSEWHERE CLASSIFIED: ICD-10-CM

## 2019-05-01 DIAGNOSIS — F41.9 ANXIETY DISORDER, UNSPECIFIED: ICD-10-CM

## 2019-05-01 DIAGNOSIS — L02.611 CUTANEOUS ABSCESS OF RIGHT FOOT: ICD-10-CM

## 2019-05-01 DIAGNOSIS — L29.9 PRURITUS, UNSPECIFIED: ICD-10-CM

## 2019-05-01 DIAGNOSIS — E87.1 HYPO-OSMOLALITY AND HYPONATREMIA: ICD-10-CM

## 2019-05-01 DIAGNOSIS — Z29.9 ENCOUNTER FOR PROPHYLACTIC MEASURES, UNSPECIFIED: ICD-10-CM

## 2019-05-01 DIAGNOSIS — R63.8 OTHER SYMPTOMS AND SIGNS CONCERNING FOOD AND FLUID INTAKE: ICD-10-CM

## 2019-05-01 DIAGNOSIS — L03.90 CELLULITIS, UNSPECIFIED: ICD-10-CM

## 2019-05-01 DIAGNOSIS — Z98.82 BREAST IMPLANT STATUS: Chronic | ICD-10-CM

## 2019-05-01 DIAGNOSIS — I10 ESSENTIAL (PRIMARY) HYPERTENSION: ICD-10-CM

## 2019-05-01 LAB
ALBUMIN SERPL ELPH-MCNC: 3.8 G/DL — SIGNIFICANT CHANGE UP (ref 3.3–5)
ALP SERPL-CCNC: 52 U/L — SIGNIFICANT CHANGE UP (ref 40–120)
ALT FLD-CCNC: 29 U/L — SIGNIFICANT CHANGE UP (ref 10–45)
ANION GAP SERPL CALC-SCNC: 13 MMOL/L — SIGNIFICANT CHANGE UP (ref 5–17)
ANION GAP SERPL CALC-SCNC: 7 MMOL/L — SIGNIFICANT CHANGE UP (ref 5–17)
ANION GAP SERPL CALC-SCNC: 8 MMOL/L — SIGNIFICANT CHANGE UP (ref 5–17)
APTT BLD: 24 SEC — LOW (ref 27.5–36.3)
AST SERPL-CCNC: 30 U/L — SIGNIFICANT CHANGE UP (ref 10–40)
BASOPHILS # BLD AUTO: 0 K/UL — SIGNIFICANT CHANGE UP (ref 0–0.2)
BASOPHILS NFR BLD AUTO: 0 % — SIGNIFICANT CHANGE UP (ref 0–2)
BILIRUB SERPL-MCNC: 0.8 MG/DL — SIGNIFICANT CHANGE UP (ref 0.2–1.2)
BLD GP AB SCN SERPL QL: NEGATIVE — SIGNIFICANT CHANGE UP
BUN SERPL-MCNC: 12 MG/DL — SIGNIFICANT CHANGE UP (ref 7–23)
BUN SERPL-MCNC: 13 MG/DL — SIGNIFICANT CHANGE UP (ref 7–23)
BUN SERPL-MCNC: 13 MG/DL — SIGNIFICANT CHANGE UP (ref 7–23)
CALCIUM SERPL-MCNC: 8 MG/DL — LOW (ref 8.4–10.5)
CALCIUM SERPL-MCNC: 8.2 MG/DL — LOW (ref 8.4–10.5)
CALCIUM SERPL-MCNC: 8.5 MG/DL — SIGNIFICANT CHANGE UP (ref 8.4–10.5)
CHLORIDE SERPL-SCNC: 91 MMOL/L — LOW (ref 96–108)
CHLORIDE SERPL-SCNC: 92 MMOL/L — LOW (ref 96–108)
CHLORIDE SERPL-SCNC: 93 MMOL/L — LOW (ref 96–108)
CO2 SERPL-SCNC: 23 MMOL/L — SIGNIFICANT CHANGE UP (ref 22–31)
CO2 SERPL-SCNC: 25 MMOL/L — SIGNIFICANT CHANGE UP (ref 22–31)
CO2 SERPL-SCNC: 28 MMOL/L — SIGNIFICANT CHANGE UP (ref 22–31)
CREAT SERPL-MCNC: 0.93 MG/DL — SIGNIFICANT CHANGE UP (ref 0.5–1.3)
CREAT SERPL-MCNC: 0.94 MG/DL — SIGNIFICANT CHANGE UP (ref 0.5–1.3)
CREAT SERPL-MCNC: 1.08 MG/DL — SIGNIFICANT CHANGE UP (ref 0.5–1.3)
CRP SERPL-MCNC: 5.92 MG/DL — HIGH (ref 0–0.4)
EOSINOPHIL # BLD AUTO: 0.28 K/UL — SIGNIFICANT CHANGE UP (ref 0–0.5)
EOSINOPHIL NFR BLD AUTO: 1.8 % — SIGNIFICANT CHANGE UP (ref 0–6)
ERYTHROCYTE [SEDIMENTATION RATE] IN BLOOD: 7 MM/HR — SIGNIFICANT CHANGE UP
GLUCOSE SERPL-MCNC: 104 MG/DL — HIGH (ref 70–99)
GLUCOSE SERPL-MCNC: 76 MG/DL — SIGNIFICANT CHANGE UP (ref 70–99)
GLUCOSE SERPL-MCNC: 99 MG/DL — SIGNIFICANT CHANGE UP (ref 70–99)
HCT VFR BLD CALC: 34.8 % — SIGNIFICANT CHANGE UP (ref 34.5–45)
HGB BLD-MCNC: 12.2 G/DL — SIGNIFICANT CHANGE UP (ref 11.5–15.5)
INR BLD: 1.07 — SIGNIFICANT CHANGE UP (ref 0.88–1.16)
LACTATE SERPL-SCNC: 1.7 MMOL/L — SIGNIFICANT CHANGE UP (ref 0.5–2)
LYMPHOCYTES # BLD AUTO: 0.27 K/UL — LOW (ref 1–3.3)
LYMPHOCYTES # BLD AUTO: 1.7 % — LOW (ref 13–44)
MCHC RBC-ENTMCNC: 34.7 PG — HIGH (ref 27–34)
MCHC RBC-ENTMCNC: 35.1 GM/DL — SIGNIFICANT CHANGE UP (ref 32–36)
MCV RBC AUTO: 98.9 FL — SIGNIFICANT CHANGE UP (ref 80–100)
MONOCYTES # BLD AUTO: 0.27 K/UL — SIGNIFICANT CHANGE UP (ref 0–0.9)
MONOCYTES NFR BLD AUTO: 1.7 % — LOW (ref 2–14)
NEUTROPHILS # BLD AUTO: 14.82 K/UL — HIGH (ref 1.8–7.4)
NEUTROPHILS NFR BLD AUTO: 89.6 % — HIGH (ref 43–77)
OSMOLALITY SERPL: 271 MOSM/KG — LOW (ref 280–301)
PLATELET # BLD AUTO: 191 K/UL — SIGNIFICANT CHANGE UP (ref 150–400)
POTASSIUM SERPL-MCNC: 3.8 MMOL/L — SIGNIFICANT CHANGE UP (ref 3.5–5.3)
POTASSIUM SERPL-MCNC: 3.8 MMOL/L — SIGNIFICANT CHANGE UP (ref 3.5–5.3)
POTASSIUM SERPL-MCNC: 4 MMOL/L — SIGNIFICANT CHANGE UP (ref 3.5–5.3)
POTASSIUM SERPL-SCNC: 3.8 MMOL/L — SIGNIFICANT CHANGE UP (ref 3.5–5.3)
POTASSIUM SERPL-SCNC: 3.8 MMOL/L — SIGNIFICANT CHANGE UP (ref 3.5–5.3)
POTASSIUM SERPL-SCNC: 4 MMOL/L — SIGNIFICANT CHANGE UP (ref 3.5–5.3)
PROT SERPL-MCNC: 6.5 G/DL — SIGNIFICANT CHANGE UP (ref 6–8.3)
PROTHROM AB SERPL-ACNC: 12.1 SEC — SIGNIFICANT CHANGE UP (ref 10–12.9)
RBC # BLD: 3.52 M/UL — LOW (ref 3.8–5.2)
RBC # FLD: 12.4 % — SIGNIFICANT CHANGE UP (ref 10.3–14.5)
RH IG SCN BLD-IMP: POSITIVE — SIGNIFICANT CHANGE UP
SODIUM SERPL-SCNC: 126 MMOL/L — LOW (ref 135–145)
SODIUM SERPL-SCNC: 127 MMOL/L — LOW (ref 135–145)
SODIUM SERPL-SCNC: 127 MMOL/L — LOW (ref 135–145)
WBC # BLD: 15.63 K/UL — HIGH (ref 3.8–10.5)
WBC # FLD AUTO: 15.63 K/UL — HIGH (ref 3.8–10.5)

## 2019-05-01 PROCEDURE — 93010 ELECTROCARDIOGRAM REPORT: CPT

## 2019-05-01 PROCEDURE — 71045 X-RAY EXAM CHEST 1 VIEW: CPT | Mod: 26

## 2019-05-01 PROCEDURE — 73701 CT LOWER EXTREMITY W/DYE: CPT | Mod: 26,RT

## 2019-05-01 PROCEDURE — 99285 EMERGENCY DEPT VISIT HI MDM: CPT | Mod: 25

## 2019-05-01 RX ORDER — CLONAZEPAM 1 MG
1 TABLET ORAL
Qty: 0 | Refills: 0 | COMMUNITY

## 2019-05-01 RX ORDER — SODIUM CHLORIDE 9 MG/ML
1000 INJECTION INTRAMUSCULAR; INTRAVENOUS; SUBCUTANEOUS ONCE
Qty: 0 | Refills: 0 | Status: COMPLETED | OUTPATIENT
Start: 2019-05-01 | End: 2019-05-01

## 2019-05-01 RX ORDER — VANCOMYCIN HCL 1 G
1000 VIAL (EA) INTRAVENOUS ONCE
Qty: 0 | Refills: 0 | Status: COMPLETED | OUTPATIENT
Start: 2019-05-01 | End: 2019-05-01

## 2019-05-01 RX ORDER — DIPHENHYDRAMINE HCL 50 MG
25 CAPSULE ORAL ONCE
Qty: 0 | Refills: 0 | Status: COMPLETED | OUTPATIENT
Start: 2019-05-01 | End: 2019-05-01

## 2019-05-01 RX ORDER — METOPROLOL TARTRATE 50 MG
25 TABLET ORAL DAILY
Qty: 0 | Refills: 0 | Status: DISCONTINUED | OUTPATIENT
Start: 2019-05-01 | End: 2019-05-02

## 2019-05-01 RX ORDER — SODIUM CHLORIDE 9 MG/ML
1000 INJECTION INTRAMUSCULAR; INTRAVENOUS; SUBCUTANEOUS
Qty: 0 | Refills: 0 | Status: DISCONTINUED | OUTPATIENT
Start: 2019-05-01 | End: 2019-05-01

## 2019-05-01 RX ORDER — VANCOMYCIN HCL 1 G
1000 VIAL (EA) INTRAVENOUS EVERY 12 HOURS
Qty: 0 | Refills: 0 | Status: DISCONTINUED | OUTPATIENT
Start: 2019-05-02 | End: 2019-05-02

## 2019-05-01 RX ORDER — METOPROLOL TARTRATE 50 MG
1 TABLET ORAL
Qty: 0 | Refills: 0 | COMMUNITY

## 2019-05-01 RX ORDER — DIPHENHYDRAMINE HCL 50 MG
25 CAPSULE ORAL EVERY 4 HOURS
Qty: 0 | Refills: 0 | Status: DISCONTINUED | OUTPATIENT
Start: 2019-05-01 | End: 2019-05-03

## 2019-05-01 RX ORDER — PIPERACILLIN AND TAZOBACTAM 4; .5 G/20ML; G/20ML
3.38 INJECTION, POWDER, LYOPHILIZED, FOR SOLUTION INTRAVENOUS EVERY 6 HOURS
Qty: 0 | Refills: 0 | Status: DISCONTINUED | OUTPATIENT
Start: 2019-05-01 | End: 2019-05-02

## 2019-05-01 RX ORDER — HEPARIN SODIUM 5000 [USP'U]/ML
5000 INJECTION INTRAVENOUS; SUBCUTANEOUS EVERY 8 HOURS
Qty: 0 | Refills: 0 | Status: DISCONTINUED | OUTPATIENT
Start: 2019-05-01 | End: 2019-05-03

## 2019-05-01 RX ORDER — METOPROLOL TARTRATE 50 MG
0 TABLET ORAL
Qty: 0 | Refills: 0 | COMMUNITY

## 2019-05-01 RX ORDER — KETOROLAC TROMETHAMINE 30 MG/ML
15 SYRINGE (ML) INJECTION ONCE
Qty: 0 | Refills: 0 | Status: DISCONTINUED | OUTPATIENT
Start: 2019-05-01 | End: 2019-05-01

## 2019-05-01 RX ORDER — TRAZODONE HCL 50 MG
1 TABLET ORAL
Qty: 0 | Refills: 0 | COMMUNITY

## 2019-05-01 RX ORDER — TRAZODONE HCL 50 MG
150 TABLET ORAL AT BEDTIME
Qty: 0 | Refills: 0 | Status: DISCONTINUED | OUTPATIENT
Start: 2019-05-01 | End: 2019-05-03

## 2019-05-01 RX ADMIN — SODIUM CHLORIDE 1000 MILLILITER(S): 9 INJECTION INTRAMUSCULAR; INTRAVENOUS; SUBCUTANEOUS at 12:10

## 2019-05-01 RX ADMIN — SODIUM CHLORIDE 100 MILLILITER(S): 9 INJECTION INTRAMUSCULAR; INTRAVENOUS; SUBCUTANEOUS at 21:40

## 2019-05-01 RX ADMIN — Medication 25 MILLIGRAM(S): at 12:10

## 2019-05-01 RX ADMIN — Medication 25 MILLIGRAM(S): at 16:26

## 2019-05-01 RX ADMIN — Medication 15 MILLIGRAM(S): at 12:10

## 2019-05-01 RX ADMIN — Medication 150 MILLIGRAM(S): at 21:40

## 2019-05-01 RX ADMIN — HEPARIN SODIUM 5000 UNIT(S): 5000 INJECTION INTRAVENOUS; SUBCUTANEOUS at 21:40

## 2019-05-01 RX ADMIN — PIPERACILLIN AND TAZOBACTAM 200 GRAM(S): 4; .5 INJECTION, POWDER, LYOPHILIZED, FOR SOLUTION INTRAVENOUS at 18:27

## 2019-05-01 RX ADMIN — Medication 25 MILLIGRAM(S): at 21:50

## 2019-05-01 RX ADMIN — Medication 250 MILLIGRAM(S): at 15:22

## 2019-05-01 NOTE — H&P ADULT - NSHPLABSRESULTS_GEN_ALL_CORE
12.2   15.63 )-----------( 191      ( 01 May 2019 12:04 )             34.8     05-01    127<L>  |  91<L>  |  13  ----------------------------<  104<H>  4.0   |  23  |  0.94    Ca    8.5      01 May 2019 12:04    TPro  6.5  /  Alb  3.8  /  TBili  0.8  /  DBili  x   /  AST  30  /  ALT  29  /  AlkPhos  52  05-01    PT/INR - ( 01 May 2019 12:04 )   PT: 12.1 sec;   INR: 1.07          PTT - ( 01 May 2019 12:04 )  PTT:24.0 sec          Lactate, Blood: 1.7 mmoL/L (05-01 @ 13:22)      RADIOLOGY, EKG & ADDITIONAL TESTS: Reviewed.

## 2019-05-01 NOTE — H&P ADULT - PROBLEM SELECTOR PLAN 1
Pt with R toe pain and swelling since February s/p injection. Was treated with outpatient Abx, and recently admitted for cellulitis (treated with PO doxycycline). Cellulitis resolved on discharge in early april, but returned and this time present with fluctuance.  Elevated CRP, lactate and ESR WNL. No e/o osteo on CT. Abscess drained in ED by podiatry.  -c/w vanc 1g QD; vanc trough prior to 4th dose  -appreciate Dr. Medeiros recs  -appreciate podiatry recs  -fu blood cx  -fu abscess fluid cx/gram stain/crystals  -WBAT Pt with R 1st toe pain and swelling since February s/p injection. Was treated with outpatient Abx, and recently admitted for cellulitis (treated with PO doxycycline). Cellulitis resolved on discharge in early april, but returned and this time present with fluctuance.  Elevated CRP, lactate and ESR WNL. No e/o osteo on CT. Abscess drained in ED by podiatry.  -c/w vanc 1g QD; vanc trough prior to 4th dose  -appreciate Dr. Medeiros recs  -appreciate podiatry recs  -fu blood cx  -fu abscess fluid cx/gram stain/crystals  -WBAT Pt with R 1st toe pain and swelling since February s/p injection. Was treated with outpatient Abx, and recently admitted for cellulitis (treated with PO doxycycline). Cellulitis resolved on discharge in early april, but returned and this time present with fluctuance.  Elevated CRP, lactate and ESR WNL. No e/o osteo on CT. Abscess drained in ED by podiatry.  -c/w vanc 1g Q12; vanc trough prior to 4th dose  -appreciate Dr. Medeiros recs  -appreciate podiatry recs  -fu blood cx  -fu abscess fluid cx/gram stain/crystals  -WBAT Pt with R 1st toe pain and swelling since February s/p injection. Was treated with outpatient Abx, and recently admitted for cellulitis (treated with PO doxycycline). Cellulitis resolved on discharge in early april, but returned and this time present with fluctuance.  Elevated CRP, lactate and ESR WNL. No e/o osteo on CT. Abscess drained in ED by podiatry.  -c/w vanc 1g Q12; vanc trough prior to 4th dose  -appreciate Dr. Medeiros recs  -appreciate podiatry recs  -fu blood cx  -fu abscess fluid cx/gram stain/crystals  -WBAT    #sepsis 2/2 cellulitis  Pt found to be septic on admission (leukocytosis, tachycardic to 99), 2/2 cellulitis  -plan as above Pt with R 1st toe pain and swelling since February s/p injection. Was treated with outpatient Abx, and recently admitted for cellulitis (treated with PO doxycycline). Cellulitis resolved on discharge in early april, but returned and this time present with fluctuance.  Elevated CRP, lactate and ESR WNL. No e/o osteo on CT. Abscess drained in ED by podiatry.  -c/w vanc 1g Q12; vanc trough prior to 4th dose  -c/w zosyn 3.375 q8  -appreciate Dr. Medeiros recs  -appreciate podiatry recs  -fu blood cx  -fu abscess fluid cx/gram stain/crystals  -WBAT    #sepsis 2/2 cellulitis  Pt found to be septic on admission (leukocytosis, tachycardic to 99), 2/2 cellulitis  -plan as above

## 2019-05-01 NOTE — CONSULT NOTE ADULT - SUBJECTIVE AND OBJECTIVE BOX
HPI: Patient developed R forefoot pain and swelling 2 months ago while vacationing in [place]; upon return went to ER, Ultrasound done and showed some collection but unable to culture, given empitic antibiotics that she did not complete; 3 weeks ago given oral Doxycycline, completed 10 days, no improvement and 2 days ago given Bactrim by PCP Dr Foote and she developed rash after 1st dose and developed low grade fever and chills last few days.      PAST MEDICAL & SURGICAL HISTORY:  Anxiety  HTN (hypertension)  H/O breast augmentation        REVIEW OF SYSTEMS:    General:(+) fevers, (+) chills  Skin/Breast: no rash  Respiratory and Thorax: no SOB, no cough  Cardiovascular:	No chest pain  Gastrointestinal:	 no nausea, vomiting , diarrhea  Genitourinary:	no dysuria, no difficulty urinating, no hematuria  Musculoskeletal:	no weakness, no joint swelling/pain  Neurological:	no focal weakness/numbness  Endocrine: no polyuria, no polydipsia      ANTIBIOTICS:  MEDICATIONS  (STANDING):    MEDICATIONS  (PRN):      Allergies: latex (Other), Sulfa-rash      SOCIAL HISTORY:no smoking, no ETOH    FAMILY HISTORY:  FH: diabetes mellitus      Vital Signs Last 24 Hrs  T(C): 37.6 (01 May 2019 12:42), Max: 37.6 (01 May 2019 12:42)  T(F): 99.6 (01 May 2019 12:42), Max: 99.6 (01 May 2019 12:42)  HR: 94 (01 May 2019 12:42) (94 - 99)  BP: 112/72 (01 May 2019 12:42) (112/69 - 112/72)  BP(mean): --  RR: 18 (01 May 2019 12:42) (18 - 18)  SpO2: 97% (01 May 2019 12:42) (97% - 98%)      PHYSICAL EXAM:  Constitutional:Well-developed, well nourished  Eyes:LOI, EOMI  Ear/Nose/Throat: no oral lesion, no sinus tenderness on percussion	  Neck:no JVD, no lymphadenopathy, supple  Respiratory: CTA guille  Cardiovascular: S1S2 RRR, no murmurs  Gastrointestinal:soft, (+) BS, no HSM  Extremities: R forefoot 5x7 cm erythema end edema with central fluctuance  Vascular: DP Pulse: right normal; left normal            LABS:                        12.2   15.63 )-----------( 191      ( 01 May 2019 12:04 )             34.8     05-01    127<L>  |  91<L>  |  13  ----------------------------<  104<H>  4.0   |  23  |  0.94    Ca    8.5      01 May 2019 12:04    TPro  6.5  /  Alb  3.8  /  TBili  0.8  /  DBili  x   /  AST  30  /  ALT  29  /  AlkPhos  52  05-01    PT/INR - ( 01 May 2019 12:04 )   PT: 12.1 sec;   INR: 1.07          PTT - ( 01 May 2019 12:04 )  PTT:24.0 sec      MICROBIOLOGY:  RADIOLOGY & ADDITIONAL STUDIES:

## 2019-05-01 NOTE — CONSULT NOTE ADULT - ASSESSMENT
59 yo Female with R forefoot abscess, fever, chills and leukocytosis, r/o Bacteremia and osteomyelitis

## 2019-05-01 NOTE — ED PROVIDER NOTE - CLINICAL SUMMARY MEDICAL DECISION MAKING FREE TEXT BOX
57 y/o female with worsening right foot cellulitis with abscess. VS nml however with subjective fever and chills with elevated WBC and crp. Podiatry consulted. I&D conducted and sent to lab for culture. Start IV vancomycin. Hyponatremia noted - gentle fluid hydration. No signs of AMS. Pt otherwise admitted for failed PO abx

## 2019-05-01 NOTE — ED PROVIDER NOTE - PROGRESS NOTE DETAILS
Podiatry consulted. Plan for labs, CT foot with possible I&D by podiatry with admission for IV abx with fluid hydration for asymptomatic hyponatremia and admission under Dr. Medeiros

## 2019-05-01 NOTE — H&P ADULT - NSHPPHYSICALEXAM_GEN_ALL_CORE
Constitutional: WDWN resting sitting in a chair; NAD  Head: NC/AT  Eyes:  clear conjunctiva  ENT: no nasal discharge; MMM  Respiratory: CTA B/L; no W/R/R, no retractions  Cardiac: +S1/S2; RRR; no M/R/G  Gastrointestinal: soft, NT/ND; no rebound or guarding; normoactive BS  Extremities: WWP, no clubbing or cyanosis; no peripheral edema; R foot wrapped; sensation distally intact, ROM full, strength 5/5 at ankle  Dermatologic: skin warm, dry and intact; no rashes, wounds, or scars,   Psychiatric: affect and characteristics of appearance, verbalizations, behaviors are appropriate Constitutional: WDWN resting sitting in a chair; NAD  Head: NC/AT  Eyes:  clear conjunctiva  ENT: no nasal discharge; MMM  Respiratory: CTA B/L; no W/R/R, no retractions  Cardiac: +S1/S2; RRR; no M/R/G  Gastrointestinal: soft, NT/ND; no rebound or guarding; normoactive BS  Extremities: WWP, no clubbing or cyanosis; no peripheral edema; R foot wrapped; sensation distally intact, ROM full, strength 5/5 at ankle  Dermatologic: skin warm, dry and intact; no rashes, wounds, or scars, however pt is actively ithcing her palms  Psychiatric: affect and characteristics of appearance, verbalizations, behaviors are appropriate

## 2019-05-01 NOTE — H&P ADULT - PROBLEM SELECTOR PLAN 3
Pt with a hx of HTN on Pt with a hx of HTN on metoprolol at home  -c/w home metoprolol Pt with pruritis, mainly palmar, without rash. Likely 2/2 bactrim  -avoid sulfa containing meds  -benadryl q4 PRN  -CTM

## 2019-05-01 NOTE — ED ADULT TRIAGE NOTE - CHIEF COMPLAINT QUOTE
right foot infection x weeks being followed by her Podiatrist.  Took bactrim and had an allergic reaction yesterday.  Verbalized her face swelled up and had a rash to body.  Denies chest / throat discomfort

## 2019-05-01 NOTE — ED PROVIDER NOTE - LOWER EXTREMITY EXAM, RIGHT
SLIGHT DECREASED ROM OF THE 1ST MTP WITH MODERATE SWELLING AND TTP WITH OVERLYING FLUCTUANCE APPROXIMATELY 3X3 CM, SOFT COMPARTMENT WITH 2+ PULSE AND GOOD CAP REFILL/LIMITED ROM

## 2019-05-01 NOTE — H&P ADULT - PROBLEM SELECTOR PLAN 5
F: s/p 1L NS in the ED  E: replete PRN  N: dash/tlc Pt with a hx of anxiety on trazadone at home. Also reports taking clonazepam PRN  -c/w home trazadone

## 2019-05-01 NOTE — H&P ADULT - PROBLEM SELECTOR PLAN 7
Transition of Care   1) PCP Contacted on Admission: (Y/N) --> Name & Phone #:  2) Date of Contact with PCP:  3) PCP Contacted at Discharge: (Y/N)  4) Summary of Handoff Given to PCP:   5) Post-Discharge Appointment Date and Location: Transition of Care   1) PCP Contacted on Admission: (Y/N) --> Name & Phone #: Dr. Daly Foote [phone]  2) Date of Contact with PCP:  3) PCP Contacted at Discharge: (Y/N)  4) Summary of Handoff Given to PCP:   5) Post-Discharge Appointment Date and Location: VTE: Hep SQ    dispo: [facility]  FUL CODE

## 2019-05-01 NOTE — ED ADULT TRIAGE NOTE - WEIGHT IN KG
Colonoscopy biopsy cold biopsy polypectomy    Dr. Radha Parker    Change in bowel pattern family history colorectal cancer    EBL 0 prep adequate    Last exam normal colonoscopy 2013    Sedation which I supervised Demerol 50 mg Versed 6 mg intravenously start 840 scope and 849 cecum 901 removal 910    Consent was obtained including the risk of perforation bleeding drug reaction infection she agrees to proceed ASA class to airway class II cardiopulmonary monitoring was done before during and after the procedure with pulse oximetry 2-lead ECG and blood pressure no abnormalities were noted on 2 L of oxygen    Sedation was given digital exam was unremarkable a video Olympus pediatric colonoscope was introduced into the rectum advanced to the ileum. The valve appendiceal orifice and ileum showed no nodularity or erythema. Preparation was adequate scope was removed demonstrating a 3 mm reddish pink flat polyp just above the rectosigmoid junction in the sigmoid colon removed with cold biopsy polypectomy otherwise the exam was significant for mild tortuosity of the splenic flexure no erythema ulceration polyp mass or exudate in the cecum ascending transverse descending or sigmoid colon random biopsies were done retroflexion small nonbleeding internal hemorrhoids scope was removed patient tolerated procedure well without apparent complication    Impression recommendation  Small polyp removed check histology repeat exam in 5 years due to family history of colon cancer as long as there is no high-grade dysplasia  Check biopsies for microscopic or collagenous colitis.  Biopsies are normal since she has such severe changes in bowel pattern requiring MiraLAX 3 doses t.i.d. essentially just to keep her bowels active I would recommend treating her as a constipated IBS patient with either Linzess or Amitiza as a trial. Further recommendations pending path review   64.6

## 2019-05-01 NOTE — ED ADULT NURSE REASSESSMENT NOTE - NS ED NURSE REASSESS COMMENT FT1
Patient and family agitated and demanding to speak to a manager about getting a room in the hospital. Patient becoming agitated about possibility of going to ED holding area. SANRDA Hall aware and at bedside. Patient family member using rude language with staff. Patient educated about inpatient situation and possibility of long wait times. Will continue to assess.

## 2019-05-01 NOTE — H&P ADULT - PROBLEM SELECTOR PLAN 2
Pt with recent admission for hyponatremia in early april. Improved with IVF. Na on admission 127, asymptomatic  -fu repeat BMP 6pm  -fu urine osm and urine Na Pt with recent admission for hyponatremia in early april. Improved with IVF. Na on admission 127, asymptomatic  -likely hyponatremic hyponatremia as pt with recent emesis and diarrhea likely 2/2 bactrim. Pt also notes she feels dehydrated  -fu repeat BMP 6pm. Will start maintenance IVF if improved s/p 1L Ns in ED  -fu urine osm and urine Na Pt with recent admission for hyponatremia in early april. Improved with IVF. Na on admission 127, asymptomatic. Received 1L NS bolus in ED  -likely hyponatremic hyponatremia as pt with recent emesis and diarrhea likely 2/2 bactrim. Pt also notes she feels dehydrated  -fu repeat BMP STAT. Will start maintenance IVF if improved s/p 1L Ns in ED  -fu urine osm and urine Na  -fu serum osm

## 2019-05-01 NOTE — CONSULT NOTE ADULT - SUBJECTIVE AND OBJECTIVE BOX
Attending:    Patient is a 58y old  Female who presents with a chief complaint of Right toe swelling and pain    HPI:  57 y/o female with a PMHx of HTN and anxiety who presented to the ED with worsening pain and swelling to her right hallux since Thursday. Pt reports she received a cortisone injection on the right 1st MPJ in February for joint pain. Shortly thereafter she traveled to [place] and began noticing increased redness, swelling and pain. She presented to the ED in April and had an ultrasound of the toe but there was no collection to culture but was started on Doxycycline for 10 days with no improvement. She was started on Bactrim by her PCP Dr. Foote 2 days ago and developed a rash and subjective fevers. She was referred to the ED to be seen by Dr. Medeiros.  Review of systems negative except per HPI    PAST MEDICAL & SURGICAL HISTORY:  Anxiety  HTN (hypertension)  H/O breast augmentation    Home Medications:  metoprolol: orally once a day (10 Apr 2019 18:36)  traZODone 150 mg oral tablet: 1 tab(s) orally once a day (at bedtime), As Needed (10 Apr 2019 18:36)    Allergies    Bactrim (Flushing; Pruritus)  latex (Other)    Intolerances      FAMILY HISTORY:  FH: diabetes mellitus    Social History:       LABS                        12.2   15.63 )-----------( 191      ( 01 May 2019 12:04 )             34.8     05-01    127<L>  |  91<L>  |  13  ----------------------------<  104<H>  4.0   |  23  |  0.94    Ca    8.5      01 May 2019 12:04    TPro  6.5  /  Alb  3.8  /  TBili  0.8  /  DBili  x   /  AST  30  /  ALT  29  /  AlkPhos  52  05-01    PT/INR - ( 01 May 2019 12:04 )   PT: 12.1 sec;   INR: 1.07          PTT - ( 01 May 2019 12:04 )  PTT:24.0 sec  ESR: 7  CRP: --  05-01 @ 12:04    Vital Signs Last 24 Hrs  T(C): 37.6 (01 May 2019 12:42), Max: 37.6 (01 May 2019 12:42)  T(F): 99.6 (01 May 2019 12:42), Max: 99.6 (01 May 2019 12:42)  HR: 94 (01 May 2019 12:42) (94 - 99)  BP: 112/72 (01 May 2019 12:42) (112/69 - 112/72)  BP(mean): --  RR: 18 (01 May 2019 12:42) (18 - 18)  SpO2: 97% (01 May 2019 12:42) (97% - 98%)    PHYSICAL EXAM  General: NAD, AA0x3    Lower Extremity Focused:  Vasc: DP and PT 2/4 b/l, CFT wnl b/l  Derm: 4x4cm area of edema and erythema dorsal to the right foot 1st MPJ with fluctuance. No open lesions, no drainage. Tenderness to palpation along the joint extending plantarly.   Neuro: Protective sensation intact b/l  MSK: Pain on ROM of Right 1st MPJ    RADIOLOGY  < from: CT Foot w/ IV Cont, Right (05.01.19 @ 13:55) >  EXAM:  CT FOOT ONLY IC RT                          PROCEDURE DATE:  05/01/2019          INTERPRETATION:  CT OF THE RIGHT FOOT dated 5/1/2019 1:55 PM:    CLINICAL INDICATION: Right foot infection, rule out abscess    TECHNIQUE: Axial imaging of the right foot was performed with intravenous   contrast. Sagittal and coronal reformatted images were created from the   axial data set. 95 mL of Optiray 350 were used and 15 mL were discarded.    COMPARISON: None    FINDINGS:  There is no acute fractureor dislocation. Os supra naviculare is   present. Focal subcortical sclerosis located within the anterior talus as   well as anterior calcaneus. The talar dome is intact. Ankle mortise is   congruent. No evidence of periostitis or osseous destruction.   Mineralization is preserved.    Evaluation of the soft tissues of the right foot demonstrate a 2.0 x 0.9   x 2.0 cm fluid collection overlying the dorsal aspect of the first   metatarsal head (series 8 image 27). Degenerative arthrosis first MTP   joint. An adjacent first MTP joint effusion is suspected. Communication   cannot be excluded. Subtle calcification is suspected dorsal to the MTP   joint. There are no periarticular erosions at the first MTP. No   additional fluid collections are identified. Given the limitation of CT   for evaluation of the soft tissues, there is no evidence of tendinous   injury within the visualized right foot.    IMPRESSION:  2.0 x 0.9 x 2.0 cm fluid collection overlying the dorsal aspect of first   metatarsal head.  Given clinical history, this likely represents an   abscess. Aspiration to exclude crystal-induced disease is recommended.   There is no underlying periosteal reaction or osseous destruction to   indicate osteomyelitis.      < end of copied text > Attending:    Patient is a 58y old  Female who presents with a chief complaint of Right toe swelling and pain    HPI:  59 y/o female with a PMHx of HTN and anxiety who presented to the ED with worsening pain and swelling to her right hallux since Thursday. Pt reports she received a cortisone injection on the right 1st MPJ in February for joint pain. Shortly thereafter she traveled to [place] and began noticing increased redness, swelling and pain. She presented to the ED in April and had an ultrasound of the toe but there was no collection to culture but was started on Doxycycline for 10 days with no improvement. She was started on Bactrim by her PCP Dr. Foote 2 days ago and developed a rash and subjective fevers. She was referred to the ED to be seen by Dr. Medeiros.  Review of systems negative except per HPI    PAST MEDICAL & SURGICAL HISTORY:  Anxiety  HTN (hypertension)  H/O breast augmentation    Home Medications:  metoprolol: orally once a day (10 Apr 2019 18:36)  traZODone 150 mg oral tablet: 1 tab(s) orally once a day (at bedtime), As Needed (10 Apr 2019 18:36)    Allergies    Bactrim (Flushing; Pruritus)  latex (Other)    Intolerances      FAMILY HISTORY:   diabetes mellitus      LABS                        12.2   15.63 )-----------( 191      ( 01 May 2019 12:04 )             34.8     05-01    127<L>  |  91<L>  |  13  ----------------------------<  104<H>  4.0   |  23  |  0.94    Ca    8.5      01 May 2019 12:04    TPro  6.5  /  Alb  3.8  /  TBili  0.8  /  DBili  x   /  AST  30  /  ALT  29  /  AlkPhos  52  05-01    PT/INR - ( 01 May 2019 12:04 )   PT: 12.1 sec;   INR: 1.07          PTT - ( 01 May 2019 12:04 )  PTT:24.0 sec  ESR: 7  CRP: --  05-01 @ 12:04    Vital Signs Last 24 Hrs  T(C): 37.6 (01 May 2019 12:42), Max: 37.6 (01 May 2019 12:42)  T(F): 99.6 (01 May 2019 12:42), Max: 99.6 (01 May 2019 12:42)  HR: 94 (01 May 2019 12:42) (94 - 99)  BP: 112/72 (01 May 2019 12:42) (112/69 - 112/72)  BP(mean): --  RR: 18 (01 May 2019 12:42) (18 - 18)  SpO2: 97% (01 May 2019 12:42) (97% - 98%)    PHYSICAL EXAM  General: NAD, AA0x3    Lower Extremity Focused:  Vasc: DP and PT 2/4 b/l, CFT wnl b/l  Derm: 4x4cm area of fluid collection with non-pitting edema and erythema dorsal to the right foot 1st MPJ with fluctuance. No open lesions, no drainage. Tenderness to palpation along the joint extending plantarly.   Neuro: Protective sensation intact b/l  MSK: Pain on ROM of Right 1st MPJ    RADIOLOGY  < from: CT Foot w/ IV Cont, Right (05.01.19 @ 13:55) >  EXAM:  CT FOOT ONLY IC RT                          PROCEDURE DATE:  05/01/2019          INTERPRETATION:  CT OF THE RIGHT FOOT dated 5/1/2019 1:55 PM:    CLINICAL INDICATION: Right foot infection, rule out abscess    TECHNIQUE: Axial imaging of the right foot was performed with intravenous   contrast. Sagittal and coronal reformatted images were created from the   axial data set. 95 mL of Optiray 350 were used and 15 mL were discarded.    COMPARISON: None    FINDINGS:  There is no acute fractureor dislocation. Os supra naviculare is   present. Focal subcortical sclerosis located within the anterior talus as   well as anterior calcaneus. The talar dome is intact. Ankle mortise is   congruent. No evidence of periostitis or osseous destruction.   Mineralization is preserved.    Evaluation of the soft tissues of the right foot demonstrate a 2.0 x 0.9   x 2.0 cm fluid collection overlying the dorsal aspect of the first   metatarsal head (series 8 image 27). Degenerative arthrosis first MTP   joint. An adjacent first MTP joint effusion is suspected. Communication   cannot be excluded. Subtle calcification is suspected dorsal to the MTP   joint. There are no periarticular erosions at the first MTP. No   additional fluid collections are identified. Given the limitation of CT   for evaluation of the soft tissues, there is no evidence of tendinous   injury within the visualized right foot.    IMPRESSION:  2.0 x 0.9 x 2.0 cm fluid collection overlying the dorsal aspect of first   metatarsal head.  Given clinical history, this likely represents an   abscess. Aspiration to exclude crystal-induced disease is recommended.   There is no underlying periosteal reaction or osseous destruction to   indicate osteomyelitis.      < end of copied text >

## 2019-05-01 NOTE — ED PROVIDER NOTE - CADM POA PRESS ULCER
No Localized Dermabrasion With Wire Brush Text: The patient was draped in routine manner.  Localized dermabrasion using 3 x 17 mm wire brush was performed in routine manner to papillary dermis. This spot dermabrasion is being performed to complete skin cancer reconstruction. It also will eliminate the other sun damaged precancerous cells that are known to be part of the regional effect of a lifetime's worth of sun exposure. This localized dermabrasion is therapeutic and should not be considered cosmetic in any regard. Localized Dermabrasion Text: The patient was draped in routine manner.  Localized dermabrasion using 3 x 17 mm wire brush was performed in routine manner to papillary dermis. This spot dermabrasion is being performed to complete skin cancer reconstruction. It also will eliminate the other sun damaged precancerous cells that are known to be part of the regional effect of a lifetime's worth of sun exposure. This localized dermabrasion is therapeutic and should not be considered cosmetic in any regard.

## 2019-05-01 NOTE — ED ADULT NURSE NOTE - OBJECTIVE STATEMENT
Patient is a 59yo female with multiple medical complaints. Patient reports she has had a "foot infection" x months and is being followed by podiatrist. Patient has been placed on multiple courses of abx but reports PO abx give her diarrhea and yesterday after she was placed on Bactrim, she had vomiting, dizziness, "throat closing," pruritis, facial redness, and bilateral eye swelling. Patient reports pain and swelling of the foot has increased and she has been unable to complete new course of abx due to reaction. Denies fevers, respiratory complaints, numbness/tingling.

## 2019-05-01 NOTE — ED PROVIDER NOTE - ATTENDING CONTRIBUTION TO CARE
57yo F here w/ R MTP abscess since february, s/p 3 different abx wtihout improvement, recent admission for iv abx and hyponatremia, then started on bactrim yesterday now developed allergic reaction to it. swelling/redness worse, +sub fevers/chills. needs IV abx again, concern for abscess. case d/w ID Dr. Medeiros, to admit w/ podiatry consult

## 2019-05-01 NOTE — CONSULT NOTE ADULT - ASSESSMENT
58y F with PMHx of HTN and anxiety presents with an abscess at the right foot 1st MPJ    -After sterile skin prep, aspirated site with an 18g needle, approximately 4cc of purulent and sanguinous fluid was aspirated and sent specimen for culture   -Recommend starting broad spectrum IV abx until culture results  -Applied DSD  -WBAT 58y F with PMHx of HTN and anxiety presents with an abscess at the right foot 1st MPJ    -After sterile skin prep, aspirated site with an 18g needle, approximately 4cc of purulent and sanguinous fluid was aspirated and sent specimen for culture   -Recommend starting broad spectrum IV abx until culture results  -f/u culture  -Applied DSD  -WBAT

## 2019-05-01 NOTE — H&P ADULT - PROBLEM SELECTOR PLAN 4
Pt with a hx of anxiety on Pt with a hx of anxiety on trazadone at home. Also reports taking clonazepam PRN  -c/w home trazadone Pt with a hx of HTN on metoprolol at home  -c/w home metoprolol

## 2019-05-01 NOTE — H&P ADULT - ASSESSMENT
57 y/o female with a PMHx of HTN, Anxiety, recent admission for R first metatarsal cellulitis, admitted for recurrent cellulitis with abscess, s/p drainage in the ED, admitted to [facility] for IV abx and further management. 59 y/o female with a PMHx of HTN, Anxiety, recent admission for R first metatarsal cellulitis, admitted for sepsis 2/2 recurrent cellulitis with abscess, s/p drainage in the ED, admitted to [facility] for IV abx and further management.

## 2019-05-01 NOTE — H&P ADULT - HISTORY OF PRESENT ILLNESS
57 y/o female with a PMHx of HTN, Anxiety, right sided bunion, recent admission for R first digit cellulitis, who presents for pain and swelling of her right first metatarsal for the past few days. Pain is _____. Associated with subjective fevers and chills. No skin breaks, discharge, inability to ambulate, numbness/tingling, sob, chest pain, palpitations, difficulty breathing, lip/tongue swelling, diarrhea, headaches, dizziness, confusion. Of note the pain began a few weeks after she received a cortisone injection on the right 1st MTP in February for atraumatic pain. Was associated with redness and swelling. During this time she had also traveled to [place]. She was treated with an unknown abx, which was then discontinued 2/2 diarrhea. She was then switched to bactrim, which was discontinued 2/2 rash. She was then admitted to [facility] for worsening cellulitis and hypovolemic hyponetremia, which improved with PO doxycycline and IVF. Pt was discharged on 4/11/18 however pain, redness, and swelling returned a few days ago. She saw her PCP who then referred her to the ED.     Vital Signs in the ED:    T(F): 99.6     HR: 94 - 99    BP: 112/69 - 112/72     RR: 18     SpO2: 97% - 98% on room air     Labs notable for WBC of 15.63 with neutrophilic predominance, Na of 127, CRP of 5.92. Lactate and ESR WNL.   CXR with scattered increased lung markings, no infiltrate.   CT showing 2 x .9 x 2 cm abscess of R first metatarsal head; no e/o osteomyelitis.    Pt was given 15mg toradol IVP, 25mg benadyl IVP, 1 L NS bolus. She was seen by podiatry in the ED and abscess was drained. Pt was also seen by Dr. Medeiros (ID) and started on vancomycin. Will be admitted to [facility] for further management. 57 y/o female with a PMHx of HTN, Anxiety, recent admission for R first digit cellulitis, who presents for pain and swelling of her right first metatarsal for the past few days. Pain is _____. Associated with subjective fevers and chills. No skin breaks, discharge, inability to ambulate, numbness/tingling, sob, chest pain, palpitations, difficulty breathing, lip/tongue swelling, diarrhea, headaches, dizziness, confusion. Of note the pain began a few weeks after she received a cortisone injection on the right 1st MTP in February for atraumatic pain. Was associated with redness and swelling. During this time she had also traveled to [place]. She was treated with an unknown abx, which was then discontinued 2/2 diarrhea. She was then switched to bactrim, which was discontinued 2/2 rash. She was then admitted to [facility] for worsening cellulitis and hypovolemic hyponetremia, which improved with PO doxycycline and IVF. Pt was discharged on 4/11/18 however pain, redness, and swelling returned a few days ago. She saw her PCP who then referred her to the ED.     Vital Signs in the ED:    T(F): 99.6     HR: 94 - 99    BP: 112/69 - 112/72     RR: 18     SpO2: 97% - 98% on room air     Labs notable for WBC of 15.63 with neutrophilic predominance, Na of 127, CRP of 5.92. Lactate and ESR WNL.   CXR with scattered increased lung markings, no infiltrate.   CT showing 2 x .9 x 2 cm abscess of R first metatarsal head; no e/o osteomyelitis.    Pt was given 15mg toradol IVP, 25mg benadyl IVP, 1 L NS bolus. She was seen by podiatry in the ED and abscess was drained. Pt was also seen by Dr. Medeiros (ID) and started on vancomycin. Will be admitted to [facility] for further management. 57 y/o female with a PMHx of HTN, Anxiety, recent admission for R first digit cellulitis, who presents for pain and swelling of her right first metatarsal for the past few days. Pt wore a high heel shoe after which she noticed the pain and inability to walk. Described as a shooting pain, 9/10 in severity, causing inability to move her foot. Associated with subjective fevers and chills. No skin breaks, discharge, inability to ambulate, numbness/tingling, sob, chest pain, palpitations, difficulty breathing, lip/tongue swelling, diarrhea, headaches, dizziness, confusion. She called her PCP Dr. Foote, who gave her bactrim. However after 1 dose pt began itching, had NBNB vomiting, and watery stool. PCP recommended she go to ED.    Of note pt had similar pain/redness/swelling in February, which began began a few weeks after she received a cortisone injection on the right 1st MTP for atraumatic pain. During this time she had also traveled to [place]. She was treated with an unknown abx, which was then discontinued 2/2 diarrhea. She was then switched to bactrim. However outpatient labs showed hyponatremia, and she was admitted to [facility] for cellulitis and hypovolemic hyponatremia on 4/10. Pt improved with PO doxycycline and IVF.    Vital Signs in the ED:    T(F): 99.6     HR: 94 - 99    BP: 112/69 - 112/72     RR: 18     SpO2: 97% - 98% on room air     Labs notable for WBC of 15.63 with neutrophilic predominance, Na of 127, CRP of 5.92. Lactate and ESR WNL.   CXR with scattered increased lung markings, no infiltrate.   CT showing 2 x .9 x 2 cm abscess of R first metatarsal head; no e/o osteomyelitis.    Pt was given 15mg toradol IVP, 25mg benadyl IVP, 1 L NS bolus. She was seen by podiatry in the ED and abscess was drained. Pt was also seen by Dr. Medeiros (ID) and started on vancomycin. Will be admitted to [facility] for further management. 59 y/o female with a PMHx of HTN, Anxiety, recent admission for R first metatarsal cellulitis, who presents for pain and swelling of her right first metatarsal for the past few days. Pt wore a high heel shoe after which she noticed the pain and inability to walk. Described as a shooting pain, 9/10 in severity, causing inability to move her foot. Associated with subjective fevers and chills. No skin breaks, discharge, inability to ambulate, numbness/tingling, sob, chest pain, palpitations, difficulty breathing, lip/tongue swelling, diarrhea, headaches, dizziness, confusion. She called her PCP Dr. Foote, who gave her bactrim. However after 1 dose pt began itching, had NBNB vomiting, and watery stool. PCP recommended she go to ED.    Of note pt had similar pain/redness/swelling in February, which began began a few weeks after she received a cortisone injection on the right 1st MTP for atraumatic pain. During this time she had also traveled to [place]. She was treated with an unknown abx, which was then discontinued 2/2 diarrhea. She was then switched to bactrim. However outpatient labs showed hyponatremia, and she was admitted to [facility] for cellulitis and hypovolemic hyponatremia on 4/10. Pt improved with PO doxycycline and IVF.    Vital Signs in the ED:    T(F): 99.6     HR: 94 - 99    BP: 112/69 - 112/72     RR: 18     SpO2: 97% - 98% on room air     Labs notable for WBC of 15.63 with neutrophilic predominance, Na of 127, CRP of 5.92. Lactate and ESR WNL.   CXR with scattered increased lung markings, no infiltrate.   CT showing 2 x .9 x 2 cm abscess of R first metatarsal head; no e/o osteomyelitis.    Pt was given 15mg toradol IVP, 25mg benadyl IVP, 1 L NS bolus. She was seen by podiatry in the ED and abscess was drained. Pt was also seen by Dr. Medeiros (ID) and started on vancomycin. Will be admitted to [facility] for further management.

## 2019-05-01 NOTE — ED PROVIDER NOTE - OBJECTIVE STATEMENT
59 y/o female with a PMHx of HTN, Anxiety and right bunion is present in the ED for worsening pain and swelling to her right toe since Thursday. Pt reports she received a cortisone injection on the right 1st MTP in February for atraumatic pain. Shortly thereafter she traveled to [place] and noticed increased redness, swelling and pain. Two weeks approximately after injection she was started on an unknown abx for a few days, switched to bactrim due to adverse side effects and then was ultimately switched to doxycycline. Pt reports she wore "boot" on Thursday and noticed increased pain with subjective fever and chills over the past few days. She was given bactrim yesterday, however developed a pruritic rash that has since resolved. Pt states she was referred by Dr. Foote to the ER for ID consult by Dr. Medeiros with admission for I&D by podiatry and IV abx. Pt denies the following: skin breaks, discharge, inability to ambulate, numbness/tingling, sob, chest pain, palpitations, difficulty breathing, lip/tongue swelling, diarrhea, headaches, dizziness, confusion.

## 2019-05-01 NOTE — CONSULT NOTE ADULT - PROBLEM SELECTOR RECOMMENDATION 9
1) CT R foot w/contrast  2) Podiatry consult, culture/aspiration before starting IV antibiotics  3) Send Bacterial, aerobic and anaerobic culture, AFB and fungal culture  4) Send Blood culture  5) Send ESR, CRP  6) Admit patient under my name 1) CT R foot w/contrast  2) Podiatry consult, culture/aspiration before starting IV antibiotics  3) Send Bacterial, aerobic and anaerobic culture, AFB and fungal culture  4) Start Vancomycin and Zosyn IV after above culture are taken  4) Send Blood culture  5) Send ESR, CRP  6) Admit patient under my name

## 2019-05-02 LAB
ANION GAP SERPL CALC-SCNC: 9 MMOL/L — SIGNIFICANT CHANGE UP (ref 5–17)
BUN SERPL-MCNC: 8 MG/DL — SIGNIFICANT CHANGE UP (ref 7–23)
CALCIUM SERPL-MCNC: 8.4 MG/DL — SIGNIFICANT CHANGE UP (ref 8.4–10.5)
CHLORIDE SERPL-SCNC: 99 MMOL/L — SIGNIFICANT CHANGE UP (ref 96–108)
CO2 SERPL-SCNC: 24 MMOL/L — SIGNIFICANT CHANGE UP (ref 22–31)
CREAT SERPL-MCNC: 0.97 MG/DL — SIGNIFICANT CHANGE UP (ref 0.5–1.3)
CRP SERPL-MCNC: 5.71 MG/DL — HIGH (ref 0–0.4)
ERYTHROCYTE [SEDIMENTATION RATE] IN BLOOD: 9 MM/HR — SIGNIFICANT CHANGE UP
GLUCOSE SERPL-MCNC: 113 MG/DL — HIGH (ref 70–99)
GRAM STN FLD: SIGNIFICANT CHANGE UP
GRAM STN FLD: SIGNIFICANT CHANGE UP
HCT VFR BLD CALC: 30.9 % — LOW (ref 34.5–45)
HCV AB S/CO SERPL IA: 0.06 S/CO — SIGNIFICANT CHANGE UP
HCV AB SERPL-IMP: SIGNIFICANT CHANGE UP
HGB BLD-MCNC: 10.6 G/DL — LOW (ref 11.5–15.5)
MAGNESIUM SERPL-MCNC: 1.4 MG/DL — LOW (ref 1.6–2.6)
MCHC RBC-ENTMCNC: 34 PG — SIGNIFICANT CHANGE UP (ref 27–34)
MCHC RBC-ENTMCNC: 34.3 GM/DL — SIGNIFICANT CHANGE UP (ref 32–36)
MCV RBC AUTO: 99 FL — SIGNIFICANT CHANGE UP (ref 80–100)
NRBC # BLD: 0 /100 WBCS — SIGNIFICANT CHANGE UP (ref 0–0)
OSMOLALITY UR: 155 MOSMOL/KG — SIGNIFICANT CHANGE UP (ref 100–650)
PLATELET # BLD AUTO: 140 K/UL — LOW (ref 150–400)
POTASSIUM SERPL-MCNC: 3.8 MMOL/L — SIGNIFICANT CHANGE UP (ref 3.5–5.3)
POTASSIUM SERPL-SCNC: 3.8 MMOL/L — SIGNIFICANT CHANGE UP (ref 3.5–5.3)
RBC # BLD: 3.12 M/UL — LOW (ref 3.8–5.2)
RBC # FLD: 12.6 % — SIGNIFICANT CHANGE UP (ref 10.3–14.5)
SODIUM SERPL-SCNC: 132 MMOL/L — LOW (ref 135–145)
SODIUM UR-SCNC: 52 MMOL/L — SIGNIFICANT CHANGE UP
WBC # BLD: 6.26 K/UL — SIGNIFICANT CHANGE UP (ref 3.8–10.5)
WBC # FLD AUTO: 6.26 K/UL — SIGNIFICANT CHANGE UP (ref 3.8–10.5)

## 2019-05-02 PROCEDURE — 73720 MRI LWR EXTREMITY W/O&W/DYE: CPT | Mod: 26,RT

## 2019-05-02 RX ORDER — METOPROLOL TARTRATE 50 MG
25 TABLET ORAL EVERY 24 HOURS
Qty: 0 | Refills: 0 | Status: DISCONTINUED | OUTPATIENT
Start: 2019-05-02 | End: 2019-05-03

## 2019-05-02 RX ORDER — LORATADINE 10 MG/1
10 TABLET ORAL ONCE
Qty: 0 | Refills: 0 | Status: COMPLETED | OUTPATIENT
Start: 2019-05-02 | End: 2019-05-03

## 2019-05-02 RX ORDER — CEFAZOLIN SODIUM 1 G
2000 VIAL (EA) INJECTION EVERY 8 HOURS
Qty: 0 | Refills: 0 | Status: DISCONTINUED | OUTPATIENT
Start: 2019-05-02 | End: 2019-05-03

## 2019-05-02 RX ORDER — INFLUENZA VIRUS VACCINE 15; 15; 15; 15 UG/.5ML; UG/.5ML; UG/.5ML; UG/.5ML
0.5 SUSPENSION INTRAMUSCULAR ONCE
Qty: 0 | Refills: 0 | Status: COMPLETED | OUTPATIENT
Start: 2019-05-02 | End: 2019-05-02

## 2019-05-02 RX ORDER — MAGNESIUM SULFATE 500 MG/ML
4 VIAL (ML) INJECTION ONCE
Qty: 0 | Refills: 0 | Status: COMPLETED | OUTPATIENT
Start: 2019-05-02 | End: 2019-05-02

## 2019-05-02 RX ORDER — LORATADINE 10 MG/1
10 TABLET ORAL DAILY
Qty: 0 | Refills: 0 | Status: DISCONTINUED | OUTPATIENT
Start: 2019-05-02 | End: 2019-05-03

## 2019-05-02 RX ADMIN — PIPERACILLIN AND TAZOBACTAM 200 GRAM(S): 4; .5 INJECTION, POWDER, LYOPHILIZED, FOR SOLUTION INTRAVENOUS at 00:00

## 2019-05-02 RX ADMIN — Medication 250 MILLIGRAM(S): at 03:22

## 2019-05-02 RX ADMIN — PIPERACILLIN AND TAZOBACTAM 200 GRAM(S): 4; .5 INJECTION, POWDER, LYOPHILIZED, FOR SOLUTION INTRAVENOUS at 05:30

## 2019-05-02 RX ADMIN — Medication 25 MILLIGRAM(S): at 22:46

## 2019-05-02 RX ADMIN — Medication 25 MILLIGRAM(S): at 08:58

## 2019-05-02 RX ADMIN — Medication 150 MILLIGRAM(S): at 22:46

## 2019-05-02 RX ADMIN — Medication 2000 MILLIGRAM(S): at 17:45

## 2019-05-02 RX ADMIN — LORATADINE 10 MILLIGRAM(S): 10 TABLET ORAL at 11:04

## 2019-05-02 RX ADMIN — PIPERACILLIN AND TAZOBACTAM 200 GRAM(S): 4; .5 INJECTION, POWDER, LYOPHILIZED, FOR SOLUTION INTRAVENOUS at 11:04

## 2019-05-02 RX ADMIN — Medication 100 GRAM(S): at 10:02

## 2019-05-02 NOTE — PROGRESS NOTE ADULT - ASSESSMENT
57 y/o female with a PMHx of HTN, Anxiety, recent admission for R first metatarsal cellulitis, admitted for sepsis 2/2 recurrent cellulitis with abscess, s/p drainage in the ED, admitted to [facility] for IV abx and further management.

## 2019-05-02 NOTE — PROGRESS NOTE ADULT - PROBLEM SELECTOR PLAN 3
Pt with pruritis, mainly palmar, without rash. Likely 2/2 bactrim  -avoid sulfa containing meds  -benadryl q4 PRN  -CTM Pt with pruritis, mainly palmar, without rash. Likely 2/2 bactrim  -avoid sulfa containing meds  -benadryl q4 PRN  -CTM    #Periorbital edema  Pt w/ B/L periorbital edema that was present on admission better after benadryl. No tongue swelling  - Will do cold compresses  - Will add loratadine daily for allergy relief

## 2019-05-02 NOTE — PROGRESS NOTE ADULT - SUBJECTIVE AND OBJECTIVE BOX
Patient is a 58y old  Female who presents with a chief complaint of R toe abscess (02 May 2019 07:26)      INTERVAL HPI/ OVERNIGHT EVENTS  Pt seen and examined at bedside. No overnight events. Pt c/o pain all around the big toe, but only when she moves it around. Pt reports less swelling and redness today.     LABS                        10.6   6.26  )-----------( 140      ( 02 May 2019 07:44 )             30.9     05-02    132<L>  |  99  |  8   ----------------------------<  113<H>  3.8   |  24  |  0.97    Ca    8.4      02 May 2019 07:44  Mg     1.4     05-02    TPro  6.5  /  Alb  3.8  /  TBili  0.8  /  DBili  x   /  AST  30  /  ALT  29  /  AlkPhos  52  05-01    PT/INR - ( 01 May 2019 12:04 )   PT: 12.1 sec;   INR: 1.07          PTT - ( 01 May 2019 12:04 )  PTT:24.0 sec  ESR: 7  CRP: --  05-01 @ 12:04    ICU Vital Signs Last 24 Hrs  T(C): 37.2 (02 May 2019 05:34), Max: 37.6 (01 May 2019 12:42)  T(F): 99 (02 May 2019 05:34), Max: 99.6 (01 May 2019 12:42)  HR: 75 (02 May 2019 05:34) (75 - 99)  BP: 105/63 (02 May 2019 05:34) (105/63 - 123/74)  BP(mean): --  ABP: --  ABP(mean): --  RR: 17 (02 May 2019 05:34) (16 - 18)  SpO2: 97% (02 May 2019 05:34) (97% - 98%)      RADIOLOGY  < from: CT Foot w/ IV Cont, Right (05.01.19 @ 13:55) >  EXAM:  CT FOOT ONLY IC RT                          PROCEDURE DATE:  05/01/2019          INTERPRETATION:  CT OF THE RIGHT FOOT dated 5/1/2019 1:55 PM:    CLINICAL INDICATION: Right foot infection, rule out abscess    TECHNIQUE: Axial imaging of the right foot was performed with intravenous   contrast. Sagittal and coronal reformatted images were created from the   axial data set. 95 mL of Optiray 350 were used and 15 mL were discarded.    COMPARISON: None    FINDINGS:  There is no acute fractureor dislocation. Os supra naviculare is   present. Focal subcortical sclerosis located within the anterior talus as   well as anterior calcaneus. The talar dome is intact. Ankle mortise is   congruent. No evidence of periostitis or osseous destruction.   Mineralization is preserved.    Evaluation of the soft tissues of the right foot demonstrate a 2.0 x 0.9   x 2.0 cm fluid collection overlying the dorsal aspect of the first   metatarsal head (series 8 image 27). Degenerative arthrosis first MTP   joint. An adjacent first MTP joint effusion is suspected. Communication   cannot be excluded. Subtle calcification is suspected dorsal to the MTP   joint. There are no periarticular erosions at the first MTP. No   additional fluid collections are identified. Given the limitation of CT   for evaluation of the soft tissues, there is no evidence of tendinous   injury within the visualized right foot.    IMPRESSION:  2.0 x 0.9 x 2.0 cm fluid collection overlying the dorsal aspect of first   metatarsal head.  Given clinical history, this likely represents an   abscess. Aspiration to exclude crystal-induced disease is recommended.   There is no underlying periosteal reaction or osseous destruction to   indicate osteomyelitis.    < end of copied text >      MICROBIOLOGY  Culture - Blood (05.01.19 @ 14:10)    Specimen Source: .Blood Blood    Culture Results:   No growth at 12 hours    Culture - Blood (05.01.19 @ 14:10)    Specimen Source: .Blood Blood    Culture Results:   No growth at 12 hours    Urine Microscopic-Add On (NC) (04.10.19 @ 11:43)    Red Blood Cell - Urine: < 5 /HPF    White Blood Cell - Urine: 5-10 /HPF    Bacteria: Present /HPF    Epithelial Cells: 5-10: Occasional: <3 /HPF  Few: 3-10 /HPF  Mod: 10-30 /HPF  Many: >30 /HPF /HPF      PHYSICAL EXAM  Right Lower Extremity Focused  Dressings c/d/i. No interval change in neurovascular status. Fluctuant fluid collection with non-pitting edema and erythema dorsal to the right foot 1st MPJ with fluctuance. No open lesions, no drainage. Tenderness to palpation along the joint extending plantarly.

## 2019-05-02 NOTE — PROGRESS NOTE ADULT - SUBJECTIVE AND OBJECTIVE BOX
**Incomplete Note**     INTERVAL HPI/OVERNIGHT EVENTS:  Patient was seen and examined at bedside. As per nurse and patient, no o/n events, patient resting comfortably. No complaints at this time. Patient denies: fever, chills, dizziness, weakness, HA, Changes in vision, CP, palpitations, SOB, cough, N/V/D/C, dysuria, changes in bowel movements, LE edema. ROS otherwise negative.    VITAL SIGNS:  T(F): 99 (05-02-19 @ 05:34)  HR: 75 (05-02-19 @ 05:34)  BP: 105/63 (05-02-19 @ 05:34)  RR: 17 (05-02-19 @ 05:34)  SpO2: 97% (05-02-19 @ 05:34)  Wt(kg): --    PHYSICAL EXAM:    Constitutional: WDWN, NAD  HEENT: PERRL, EOMI, sclera non-icteric, neck supple, trachea midline, no masses, no JVD, MMM, good dentition  Respiratory: CTA b/l, good air entry b/l, no wheezing, no rhonchi, no rales, without accessory muscle use and no intercostal retractions  Cardiovascular: RRR, normal S1S2, no M/R/G  Gastrointestinal: soft, NTND, no masses palpable, BS normal  Extremities: Warm, well perfused, pulses equal bilateral upper and lower extremities, no edema, no clubbing. Capillary refill <2 sec  Neurological: AAOx3, CN Grossly intact  Skin: Normal temperature, warm, dry    MEDICATIONS  (STANDING):  heparin  Injectable 5000 Unit(s) SubCutaneous every 8 hours  influenza   Vaccine 0.5 milliLiter(s) IntraMuscular once  metoprolol succinate ER 25 milliGRAM(s) Oral daily  piperacillin/tazobactam IVPB. 3.375 Gram(s) IV Intermittent every 6 hours  traZODone 150 milliGRAM(s) Oral at bedtime  vancomycin  IVPB 1000 milliGRAM(s) IV Intermittent every 12 hours    MEDICATIONS  (PRN):  diphenhydrAMINE 25 milliGRAM(s) Oral every 4 hours PRN Rash and/or Itching      Allergies    Bactrim (Flushing; Pruritus)  latex (Other)    Intolerances        LABS:                        12.2   15.63 )-----------( 191      ( 01 May 2019 12:04 )             34.8     05-01    126<L>  |  93<L>  |  12  ----------------------------<  99  3.8   |  25  |  1.08    Ca    8.0<L>      01 May 2019 22:17    TPro  6.5  /  Alb  3.8  /  TBili  0.8  /  DBili  x   /  AST  30  /  ALT  29  /  AlkPhos  52  05-01    PT/INR - ( 01 May 2019 12:04 )   PT: 12.1 sec;   INR: 1.07          PTT - ( 01 May 2019 12:04 )  PTT:24.0 sec      RADIOLOGY & ADDITIONAL TESTS:  Reviewed INTERVAL HPI/OVERNIGHT EVENTS:  Overnight, fluids were discontinued as sodium was decreasing. Today AM, patient was seen and examined at bedside. No complaints at this time. She states that she is feeling better and now she is able to move her toes. She is wondering when she will be able to go home because she has an important meeting at work tomorrow. She also states that around her eyes has been puffy since she received bactrim prior to coming to the hospital. She denies rash, tongue swelling, or difficulty swallowing. She denies: fever, chills, dizziness, weakness, HA, Changes in vision, CP, palpitations, SOB, cough, N/V/D/C, dysuria, changes in bowel movements. ROS otherwise negative.    VITAL SIGNS:  T(F): 99 (05-02-19 @ 05:34)  HR: 75 (05-02-19 @ 05:34)  BP: 105/63 (05-02-19 @ 05:34)  RR: 17 (05-02-19 @ 05:34)  SpO2: 97% (05-02-19 @ 05:34)  Wt(kg): --    PHYSICAL EXAM:    Constitutional: Well appearing female resting in bed in NAD  HEENT: Periorbital edema, PERRL, EOMI, sclera non-icteric, neck supple, trachea midline, no masses, no JVD, MMM, good dentition  Respiratory: CTA b/l, good air entry b/l, no wheezing, no rhonchi, no rales, without accessory muscle use and no intercostal retractions  Cardiovascular: RRR, normal S1S2, no M/R/G  Gastrointestinal: soft, NTND, no masses palpable, BS normal  Extremities: R foot wrapped in gauze w/ dressing CDI. Pt able to move toes. No erythema extending up leg. LLE warm, well perfused, pulses equal bilateral upper and lower extremities, no edema, no clubbing. Capillary refill <2 sec  Neurological: AAOx3, CN Grossly intact  Skin: Normal temperature, warm, dry    MEDICATIONS  (STANDING):  heparin  Injectable 5000 Unit(s) SubCutaneous every 8 hours  influenza   Vaccine 0.5 milliLiter(s) IntraMuscular once  metoprolol succinate ER 25 milliGRAM(s) Oral daily  piperacillin/tazobactam IVPB. 3.375 Gram(s) IV Intermittent every 6 hours  traZODone 150 milliGRAM(s) Oral at bedtime  vancomycin  IVPB 1000 milliGRAM(s) IV Intermittent every 12 hours    MEDICATIONS  (PRN):  diphenhydrAMINE 25 milliGRAM(s) Oral every 4 hours PRN Rash and/or Itching      Allergies    Bactrim (Flushing; Pruritus)  latex (Other)    Intolerances        LABS:                        12.2   15.63 )-----------( 191      ( 01 May 2019 12:04 )             34.8     05-01    126<L>  |  93<L>  |  12  ----------------------------<  99  3.8   |  25  |  1.08    Ca    8.0<L>      01 May 2019 22:17    TPro  6.5  /  Alb  3.8  /  TBili  0.8  /  DBili  x   /  AST  30  /  ALT  29  /  AlkPhos  52  05-01    PT/INR - ( 01 May 2019 12:04 )   PT: 12.1 sec;   INR: 1.07          PTT - ( 01 May 2019 12:04 )  PTT:24.0 sec      RADIOLOGY & ADDITIONAL TESTS:  Reviewed

## 2019-05-02 NOTE — PROGRESS NOTE ADULT - PROBLEM SELECTOR PLAN 1
Pt with R 1st toe pain and swelling since February s/p injection. Was treated with outpatient Abx, and recently admitted for cellulitis (treated with PO doxycycline). Cellulitis resolved on discharge in early april, but returned and this time present with fluctuance.  Elevated CRP, lactate and ESR WNL. No e/o osteo on CT. Abscess drained in ED by podiatry.  -c/w vanc 1g Q12; vanc trough prior to 4th dose  -c/w zosyn 3.375 q8  -appreciate Dr. Medeiros recs  -appreciate podiatry recs  -fu blood cx  -fu abscess fluid cx/gram stain/crystals  -WBAT    #sepsis 2/2 cellulitis  Pt found to be septic on admission (leukocytosis, tachycardic to 99), 2/2 cellulitis  -plan as above Pt with R 1st toe pain and swelling since February s/p injection. Was treated with outpatient Abx, and recently admitted for cellulitis (treated with PO doxycycline). Cellulitis resolved on discharge in early april, but returned and this time present with fluctuance.  Elevated CRP, lactate and ESR WNL. No e/o osteo on CT. Abscess drained in ED by podiatry.  -c/w vanc 1g Q12; vanc trough prior to 4th dose  -c/w zosyn 3.375 q8  -appreciate Dr. Medeiros recs  -appreciate podiatry recs  -fu blood cx  -fu abscess fluid cx/gram stain/crystals-negative  -WBAT    #sepsis 2/2 cellulitis  Pt found to be septic on admission (leukocytosis, tachycardic to 99), 2/2 cellulitis  -plan as above Pt with R 1st toe pain and swelling since February s/p injection. Was treated with outpatient Abx, and recently admitted for cellulitis (treated with PO doxycycline). Cellulitis resolved on discharge in early april, but returned and this time present with fluctuance.  Elevated CRP, lactate and ESR WNL. No e/o osteo on CT. Abscess drained in ED by podiatry.  -c/w vanc 1g Q12; vanc trough prior to 4th dose  -c/w zosyn 3.375 q8  -As per podiatry, will get MRI to evaluate for tendon involvement; may require washout in OR tomorrow  -f/u blood cx  -abscess fluid currently growing staph aureus; f/u culture results    #sepsis 2/2 cellulitis  RESOLVING  Pt found to be septic on admission (leukocytosis, tachycardic to 99), 2/2 cellulitis  -reperfusion exam normal  -plan as above

## 2019-05-02 NOTE — PROGRESS NOTE ADULT - PROBLEM SELECTOR PLAN 2
Pt with recent admission for hyponatremia in early april. Improved with IVF. Na on admission 127, asymptomatic. Received 1L NS bolus in ED  -likely hyponatremic hyponatremia as pt with recent emesis and diarrhea likely 2/2 bactrim. Pt also notes she feels dehydrated  -fu repeat BMP STAT. Will start maintenance IVF if improved s/p 1L Ns in ED  -fu urine osm and urine Na  -fu serum osm Pt with recent admission for hyponatremia in early April that improved with IVF. Na on admission 127, asymptomatic. Received 1L NS bolus in ED  -may be hypovolemic hyponatremia as pt with recent emesis and diarrhea likely 2/2 bactrim. Pt also notes she feels dehydrated  -fluids discontinued overnight; serum sodium now 132  -continue to monitor

## 2019-05-02 NOTE — PROGRESS NOTE ADULT - ASSESSMENT
58y F with PMHx of HTN and anxiety presents with an abscess at the right foot dorsal 1st MPJ    -C/w IV abx, as per ID and primary team  -F/u culture  -CT scan not specific for soft tissue evaluation and joint involvement. Recommend MRI w and wout contrast to r/out infection in the joint.   -Discussed with pt that if infection is extending into joint or has tendon involvement, it will require surgical washout, pt demonstrated verbal understanding   -Applied DSD  -WBAT RLE   -Podiatry to follow

## 2019-05-03 VITALS
DIASTOLIC BLOOD PRESSURE: 75 MMHG | OXYGEN SATURATION: 98 % | HEART RATE: 64 BPM | TEMPERATURE: 98 F | SYSTOLIC BLOOD PRESSURE: 127 MMHG | RESPIRATION RATE: 19 BRPM

## 2019-05-03 LAB
-  CEFAZOLIN: SIGNIFICANT CHANGE UP
-  CEFAZOLIN: SIGNIFICANT CHANGE UP
-  CLINDAMYCIN: SIGNIFICANT CHANGE UP
-  CLINDAMYCIN: SIGNIFICANT CHANGE UP
-  ERYTHROMYCIN: SIGNIFICANT CHANGE UP
-  ERYTHROMYCIN: SIGNIFICANT CHANGE UP
-  LINEZOLID: SIGNIFICANT CHANGE UP
-  LINEZOLID: SIGNIFICANT CHANGE UP
-  OXACILLIN: SIGNIFICANT CHANGE UP
-  OXACILLIN: SIGNIFICANT CHANGE UP
-  PENICILLIN: SIGNIFICANT CHANGE UP
-  PENICILLIN: SIGNIFICANT CHANGE UP
-  RIFAMPIN: SIGNIFICANT CHANGE UP
-  RIFAMPIN: SIGNIFICANT CHANGE UP
-  TRIMETHOPRIM/SULFAMETHOXAZOLE: SIGNIFICANT CHANGE UP
-  TRIMETHOPRIM/SULFAMETHOXAZOLE: SIGNIFICANT CHANGE UP
-  VANCOMYCIN: SIGNIFICANT CHANGE UP
-  VANCOMYCIN: SIGNIFICANT CHANGE UP
ANION GAP SERPL CALC-SCNC: 10 MMOL/L — SIGNIFICANT CHANGE UP (ref 5–17)
APTT BLD: 24.9 SEC — LOW (ref 27.5–36.3)
BUN SERPL-MCNC: 10 MG/DL — SIGNIFICANT CHANGE UP (ref 7–23)
CALCIUM SERPL-MCNC: 9.1 MG/DL — SIGNIFICANT CHANGE UP (ref 8.4–10.5)
CHLORIDE SERPL-SCNC: 102 MMOL/L — SIGNIFICANT CHANGE UP (ref 96–108)
CO2 SERPL-SCNC: 25 MMOL/L — SIGNIFICANT CHANGE UP (ref 22–31)
CREAT SERPL-MCNC: 0.86 MG/DL — SIGNIFICANT CHANGE UP (ref 0.5–1.3)
CULTURE RESULTS: SIGNIFICANT CHANGE UP
CULTURE RESULTS: SIGNIFICANT CHANGE UP
GLUCOSE SERPL-MCNC: 93 MG/DL — SIGNIFICANT CHANGE UP (ref 70–99)
HCT VFR BLD CALC: 33.3 % — LOW (ref 34.5–45)
HGB BLD-MCNC: 11.4 G/DL — LOW (ref 11.5–15.5)
INR BLD: 0.97 — SIGNIFICANT CHANGE UP (ref 0.88–1.16)
MAGNESIUM SERPL-MCNC: 2.1 MG/DL — SIGNIFICANT CHANGE UP (ref 1.6–2.6)
MCHC RBC-ENTMCNC: 34.2 GM/DL — SIGNIFICANT CHANGE UP (ref 32–36)
MCHC RBC-ENTMCNC: 34.2 PG — HIGH (ref 27–34)
MCV RBC AUTO: 100 FL — SIGNIFICANT CHANGE UP (ref 80–100)
METHOD TYPE: SIGNIFICANT CHANGE UP
METHOD TYPE: SIGNIFICANT CHANGE UP
NRBC # BLD: 0 /100 WBCS — SIGNIFICANT CHANGE UP (ref 0–0)
ORGANISM # SPEC MICROSCOPIC CNT: SIGNIFICANT CHANGE UP
PHOSPHATE SERPL-MCNC: 4.1 MG/DL — SIGNIFICANT CHANGE UP (ref 2.5–4.5)
PLATELET # BLD AUTO: 152 K/UL — SIGNIFICANT CHANGE UP (ref 150–400)
POTASSIUM SERPL-MCNC: 4.2 MMOL/L — SIGNIFICANT CHANGE UP (ref 3.5–5.3)
POTASSIUM SERPL-SCNC: 4.2 MMOL/L — SIGNIFICANT CHANGE UP (ref 3.5–5.3)
PROTHROM AB SERPL-ACNC: 10.9 SEC — SIGNIFICANT CHANGE UP (ref 10–12.9)
RBC # BLD: 3.33 M/UL — LOW (ref 3.8–5.2)
RBC # FLD: 12.4 % — SIGNIFICANT CHANGE UP (ref 10.3–14.5)
SODIUM SERPL-SCNC: 137 MMOL/L — SIGNIFICANT CHANGE UP (ref 135–145)
SPECIMEN SOURCE: SIGNIFICANT CHANGE UP
SPECIMEN SOURCE: SIGNIFICANT CHANGE UP
WBC # BLD: 6.18 K/UL — SIGNIFICANT CHANGE UP (ref 3.8–10.5)
WBC # FLD AUTO: 6.18 K/UL — SIGNIFICANT CHANGE UP (ref 3.8–10.5)

## 2019-05-03 PROCEDURE — 36415 COLL VENOUS BLD VENIPUNCTURE: CPT

## 2019-05-03 PROCEDURE — 83935 ASSAY OF URINE OSMOLALITY: CPT

## 2019-05-03 PROCEDURE — A9585: CPT

## 2019-05-03 PROCEDURE — 86850 RBC ANTIBODY SCREEN: CPT

## 2019-05-03 PROCEDURE — 85610 PROTHROMBIN TIME: CPT

## 2019-05-03 PROCEDURE — 10160 PNXR ASPIR ABSC HMTMA BULLA: CPT

## 2019-05-03 PROCEDURE — 87186 SC STD MICRODIL/AGAR DIL: CPT

## 2019-05-03 PROCEDURE — 86140 C-REACTIVE PROTEIN: CPT

## 2019-05-03 PROCEDURE — 84100 ASSAY OF PHOSPHORUS: CPT

## 2019-05-03 PROCEDURE — 87075 CULTR BACTERIA EXCEPT BLOOD: CPT

## 2019-05-03 PROCEDURE — 85730 THROMBOPLASTIN TIME PARTIAL: CPT

## 2019-05-03 PROCEDURE — 93005 ELECTROCARDIOGRAM TRACING: CPT | Mod: XU

## 2019-05-03 PROCEDURE — 99285 EMERGENCY DEPT VISIT HI MDM: CPT | Mod: 25

## 2019-05-03 PROCEDURE — 89060 EXAM SYNOVIAL FLUID CRYSTALS: CPT

## 2019-05-03 PROCEDURE — 87040 BLOOD CULTURE FOR BACTERIA: CPT

## 2019-05-03 PROCEDURE — 87205 SMEAR GRAM STAIN: CPT

## 2019-05-03 PROCEDURE — 83735 ASSAY OF MAGNESIUM: CPT

## 2019-05-03 PROCEDURE — 80053 COMPREHEN METABOLIC PANEL: CPT

## 2019-05-03 PROCEDURE — 73720 MRI LWR EXTREMITY W/O&W/DYE: CPT

## 2019-05-03 PROCEDURE — 86900 BLOOD TYPING SEROLOGIC ABO: CPT

## 2019-05-03 PROCEDURE — 86803 HEPATITIS C AB TEST: CPT

## 2019-05-03 PROCEDURE — 83930 ASSAY OF BLOOD OSMOLALITY: CPT

## 2019-05-03 PROCEDURE — 86901 BLOOD TYPING SEROLOGIC RH(D): CPT

## 2019-05-03 PROCEDURE — 83605 ASSAY OF LACTIC ACID: CPT

## 2019-05-03 PROCEDURE — 73701 CT LOWER EXTREMITY W/DYE: CPT

## 2019-05-03 PROCEDURE — 80048 BASIC METABOLIC PNL TOTAL CA: CPT

## 2019-05-03 PROCEDURE — 96374 THER/PROPH/DIAG INJ IV PUSH: CPT | Mod: XU

## 2019-05-03 PROCEDURE — 85025 COMPLETE CBC W/AUTO DIFF WBC: CPT

## 2019-05-03 PROCEDURE — 96375 TX/PRO/DX INJ NEW DRUG ADDON: CPT | Mod: XU

## 2019-05-03 PROCEDURE — 71045 X-RAY EXAM CHEST 1 VIEW: CPT

## 2019-05-03 PROCEDURE — 84300 ASSAY OF URINE SODIUM: CPT

## 2019-05-03 PROCEDURE — 87070 CULTURE OTHR SPECIMN AEROBIC: CPT

## 2019-05-03 PROCEDURE — 85027 COMPLETE CBC AUTOMATED: CPT

## 2019-05-03 PROCEDURE — 85652 RBC SED RATE AUTOMATED: CPT

## 2019-05-03 RX ORDER — LORATADINE 10 MG/1
1 TABLET ORAL
Qty: 30 | Refills: 0 | OUTPATIENT
Start: 2019-05-03 | End: 2019-06-01

## 2019-05-03 RX ORDER — CEPHALEXIN 500 MG
1 CAPSULE ORAL
Qty: 84 | Refills: 0 | OUTPATIENT
Start: 2019-05-03 | End: 2019-05-23

## 2019-05-03 RX ORDER — LIDOCAINE HCL 20 MG/ML
20 VIAL (ML) INJECTION ONCE
Qty: 0 | Refills: 0 | Status: COMPLETED | OUTPATIENT
Start: 2019-05-03 | End: 2019-05-03

## 2019-05-03 RX ORDER — ACETAMINOPHEN 500 MG
650 TABLET ORAL ONCE
Qty: 0 | Refills: 0 | Status: COMPLETED | OUTPATIENT
Start: 2019-05-03 | End: 2019-05-03

## 2019-05-03 RX ADMIN — Medication 650 MILLIGRAM(S): at 12:55

## 2019-05-03 RX ADMIN — Medication 2000 MILLIGRAM(S): at 10:20

## 2019-05-03 RX ADMIN — LORATADINE 10 MILLIGRAM(S): 10 TABLET ORAL at 00:04

## 2019-05-03 RX ADMIN — LORATADINE 10 MILLIGRAM(S): 10 TABLET ORAL at 11:22

## 2019-05-03 RX ADMIN — Medication 2000 MILLIGRAM(S): at 01:53

## 2019-05-03 RX ADMIN — Medication 650 MILLIGRAM(S): at 12:15

## 2019-05-03 RX ADMIN — Medication 20 MILLILITER(S): at 14:10

## 2019-05-03 NOTE — PROGRESS NOTE ADULT - ASSESSMENT
59 y/o female with a PMHx of HTN, Anxiety, recent admission for R first metatarsal cellulitis, admitted for sepsis 2/2 recurrent cellulitis with abscess, s/p drainage in the ED, admitted to [facility] for IV abx and further management.

## 2019-05-03 NOTE — PROGRESS NOTE ADULT - SUBJECTIVE AND OBJECTIVE BOX
**Incomplete Note**     INTERVAL HPI/OVERNIGHT EVENTS:  Patient was seen and examined at bedside. As per nurse and patient, no o/n events, patient resting comfortably. No complaints at this time. Patient denies: fever, chills, dizziness, weakness, HA, Changes in vision, CP, palpitations, SOB, cough, N/V/D/C, dysuria, changes in bowel movements, LE edema. ROS otherwise negative.    VITAL SIGNS:  T(F): 98.1 (05-03-19 @ 05:44)  HR: 64 (05-03-19 @ 05:44)  BP: 127/75 (05-03-19 @ 05:44)  RR: 19 (05-03-19 @ 05:44)  SpO2: 98% (05-03-19 @ 05:44)  Wt(kg): --    PHYSICAL EXAM:    Constitutional: WDWN, NAD  HEENT: PERRL, EOMI, sclera non-icteric, neck supple, trachea midline, no masses, no JVD, MMM, good dentition  Respiratory: CTA b/l, good air entry b/l, no wheezing, no rhonchi, no rales, without accessory muscle use and no intercostal retractions  Cardiovascular: RRR, normal S1S2, no M/R/G  Gastrointestinal: soft, NTND, no masses palpable, BS normal  Extremities: Warm, well perfused, pulses equal bilateral upper and lower extremities, no edema, no clubbing. Capillary refill <2 sec  Neurological: AAOx3, CN Grossly intact  Skin: Normal temperature, warm, dry    MEDICATIONS  (STANDING):  ceFAZolin  Injectable. 2000 milliGRAM(s) IV Push every 8 hours  heparin  Injectable 5000 Unit(s) SubCutaneous every 8 hours  influenza   Vaccine 0.5 milliLiter(s) IntraMuscular once  lidocaine 2% Injectable 20 milliLiter(s) Local Injection once  loratadine 10 milliGRAM(s) Oral daily  metoprolol succinate ER 25 milliGRAM(s) Oral every 24 hours  traZODone 150 milliGRAM(s) Oral at bedtime    MEDICATIONS  (PRN):  diphenhydrAMINE 25 milliGRAM(s) Oral every 4 hours PRN Rash and/or Itching      Allergies    Bactrim (Flushing; Pruritus)  latex (Other)    Intolerances        LABS:                        11.4   6.18  )-----------( 152      ( 03 May 2019 06:09 )             33.3     05-03    137  |  102  |  10  ----------------------------<  93  4.2   |  25  |  0.86    Ca    9.1      03 May 2019 06:10  Phos  4.1     05-03  Mg     2.1     05-03    TPro  6.5  /  Alb  3.8  /  TBili  0.8  /  DBili  x   /  AST  30  /  ALT  29  /  AlkPhos  52  05-01    PT/INR - ( 03 May 2019 06:09 )   PT: 10.9 sec;   INR: 0.97          PTT - ( 03 May 2019 06:09 )  PTT:24.9 sec      RADIOLOGY & ADDITIONAL TESTS:  Reviewed

## 2019-05-03 NOTE — PROGRESS NOTE ADULT - PROBLEM SELECTOR PLAN 5
Pt with a hx of anxiety on trazadone at home. Also reports taking clonazepam PRN  -c/w home trazadone

## 2019-05-03 NOTE — PROGRESS NOTE ADULT - PROBLEM SELECTOR PLAN 8
Transition of Care   1) PCP Contacted on Admission: (Y/N) --> Name & Phone #: Dr. Daly Foote [phone]  2) Date of Contact with PCP: 5/2/19  3) PCP Contacted at Discharge: (Y/N)  4) Summary of Handoff Given to PCP:   5) Post-Discharge Appointment Date and Location:

## 2019-05-03 NOTE — PROGRESS NOTE ADULT - PROBLEM SELECTOR PLAN 2
Pt with recent admission for hyponatremia in early April that improved with IVF. Na on admission 127, asymptomatic. Received 1L NS bolus in ED  -may be hypovolemic hyponatremia as pt with recent emesis and diarrhea likely 2/2 bactrim. Pt also notes she feels dehydrated  -fluids discontinued overnight; serum sodium now 132  -continue to monitor

## 2019-05-03 NOTE — DISCHARGE NOTE PROVIDER - HOSPITAL COURSE
57 y/o female with a PMHx of HTN, Anxiety, recent admission for R first metatarsal cellulitis, admitted for sepsis 2/2 recurrent cellulitis with abscess. Pt had drainage by podiatry which grew staph aureus. She was initially on vanc/zosyn and was switched to cefazolin. CT of the foot showed abscess of first metatarsal. MRI showed synovitis. Podiatry drained further abscess. Patient to be discharged home on 3 week course of keflex.

## 2019-05-03 NOTE — PROGRESS NOTE ADULT - SUBJECTIVE AND OBJECTIVE BOX
Patient is a 58y old  Female who presents with a chief complaint of R toe abscess (02 May 2019 10:05)      INTERVAL HPI/ OVERNIGHT EVENTS  Patient resting comfortably, notes an improvement in pain but says pain persists around the big toe when she moves it.    LABS                        11.4   6.18  )-----------( 152      ( 03 May 2019 06:09 )             33.3     05-03    137  |  102  |  10  ----------------------------<  93  4.2   |  25  |  0.86    Ca    9.1      03 May 2019 06:10  Phos  4.1     05-03  Mg     2.1     05-03    TPro  6.5  /  Alb  3.8  /  TBili  0.8  /  DBili  x   /  AST  30  /  ALT  29  /  AlkPhos  52  05-01    PT/INR - ( 03 May 2019 06:09 )   PT: 10.9 sec;   INR: 0.97          PTT - ( 03 May 2019 06:09 )  PTT:24.9 sec    ICU Vital Signs Last 24 Hrs  T(C): 36.7 (03 May 2019 05:44), Max: 37.2 (02 May 2019 14:03)  T(F): 98.1 (03 May 2019 05:44), Max: 98.9 (02 May 2019 14:03)  HR: 64 (03 May 2019 05:44) (64 - 93)  BP: 127/75 (03 May 2019 05:44) (101/67 - 130/81)  BP(mean): --  ABP: --  ABP(mean): --  RR: 19 (03 May 2019 05:44) (17 - 19)  SpO2: 98% (03 May 2019 05:44) (96% - 98%)      RADIOLOGY  < from: CT Foot w/ IV Cont, Right (05.01.19 @ 13:55) >  EXAM:  CT FOOT ONLY IC RT                          PROCEDURE DATE:  05/01/2019          INTERPRETATION:  CT OF THE RIGHT FOOT dated 5/1/2019 1:55 PM:    CLINICAL INDICATION: Right foot infection, rule out abscess    TECHNIQUE: Axial imaging of the right foot was performed with intravenous   contrast. Sagittal and coronal reformatted images were created from the   axial data set. 95 mL of Optiray 350 were used and 15 mL were discarded.    COMPARISON: None    FINDINGS:  There is no acute fractureor dislocation. Os supra naviculare is   present. Focal subcortical sclerosis located within the anterior talus as   well as anterior calcaneus. The talar dome is intact. Ankle mortise is   congruent. No evidence of periostitis or osseous destruction.   Mineralization is preserved.    Evaluation of the soft tissues of the right foot demonstrate a 2.0 x 0.9   x 2.0 cm fluid collection overlying the dorsal aspect of the first   metatarsal head (series 8 image 27). Degenerative arthrosis first MTP   joint. An adjacent first MTP joint effusion is suspected. Communication   cannot be excluded. Subtle calcification is suspected dorsal to the MTP   joint. There are no periarticular erosions at the first MTP. No   additional fluid collections are identified. Given the limitation of CT   for evaluation of the soft tissues, there is no evidence of tendinous   injury within the visualized right foot.    IMPRESSION:  2.0 x 0.9 x 2.0 cm fluid collection overlying the dorsal aspect of first   metatarsal head.  Given clinical history, this likely represents an   abscess. Aspiration to exclude crystal-induced disease is recommended.   There is no underlying periosteal reaction or osseous destruction to   indicate osteomyelitis.    < end of copied text >    MICROBIOLOGY  Culture - Abscess with Gram Stain (05.01.19 @ 17:04)    Gram Stain:   No organisms seen  Moderate White blood cells    Specimen Source: .Abscess Right foot 1st MPJ    Culture Results:   Moderate Staphylococcus aureus  Susceptibility to follow.        PHYSICAL EXAM  Right Lower Extremity Focused  Dressings c/d/i. No interval change in neurovascular status. Fluctuant fluid collection with non-pitting edema and erythema dorsal to the right foot 1st MPJ with fluctuance. No open lesions, no drainage. Tenderness to palpation along the joint extending plantarly.

## 2019-05-03 NOTE — DISCHARGE NOTE PROVIDER - CARE PROVIDER_API CALL
Zita Medeiros)  Infectious Disease; Internal Medicine  [address], [address] [address]  Phone: [phone]  Fax: [phone]  Follow Up Time:     Daly Foote)  [address] [address]  Phone: [phone]  Fax: [phone]  Follow Up Time: Zita Medeiros)  Infectious Disease; Internal Medicine  [address], [place] [address]  Phone: [phone]  Fax: [phone]  Follow Up Time:     Daly Foote)  [address] [address]  Phone: [phone]  Fax: [phone]  Follow Up Time:     Waldemar Suggs (DPM)  Podiatric Medicine and Surgery  [address] [place] [address]  Phone: [phone]  Fax: [phone]  Follow Up Time:

## 2019-05-03 NOTE — PROGRESS NOTE ADULT - PROBLEM SELECTOR PLAN 3
Pt with pruritis, mainly palmar, without rash. Likely 2/2 bactrim  -avoid sulfa containing meds  -benadryl q4 PRN  -CTM    #Periorbital edema  Pt w/ B/L periorbital edema that was present on admission better after benadryl. No tongue swelling  - Will do cold compresses  - Will add loratadine daily for allergy relief

## 2019-05-03 NOTE — DISCHARGE NOTE NURSING/CASE MANAGEMENT/SOCIAL WORK - NSDCDPATPORTLINK_GEN_ALL_CORE
You can access the [facility] Portal, offered by [facility], by registering with the following website: [email or web address]

## 2019-05-03 NOTE — PROGRESS NOTE ADULT - ASSESSMENT
58y F with PMHx of HTN and anxiety presents with an abscess at the right foot dorsal 1st MPJ    -C/w abx, as per ID and primary team  -F/u culture  -Based on MRI, collection does not extend into the joint space. f/u final MRI read  -After sterile skin prep and local block using Lidocaine 2% plain, performed bedside I&D using an 11 blade to make a superficial stab incision to drain collection at the 1st MPJ   -Applied DSD  -WBAT RLE   -Patient should follow up with Dr. Waldemar Suggs within 1 week of discharge.    Office information:          [place] Address- [address], [place] [address] Phone: [phone]         [place] Address- [address] [address] Phone: [phone] 58y F with PMHx of HTN and anxiety presents with an abscess at the right foot dorsal 1st MPJ    -C/w abx, as per ID and primary team    -Based on MRI, collection does not extend into the joint space.  -After sterile skin prep and local block using Lidocaine 2% plain, performed bedside I&D using an 11 blade to make a superficial stab incision to drain collection at the 1st MPJ, expressed ~5cc of pus  -Applied steri strip, DSD, Tegaderm  -WBAT RLE   -Instructed patient that she can remove the outer dressings but to leave the steristrip in place and apply a bandaid or DSD over the top. Keep site clean and dry.  -Patient should follow up with Dr. Waldemar Suggs within 1 week of discharge.    Office information:          [place] Address- [address] [facility] [address] [facility] [address], [place] [address] Phone: [phone]         [place] Address- [address], [place] [address] Phone: [phone]

## 2019-05-03 NOTE — PROGRESS NOTE ADULT - PROBLEM/PLAN-4
DISPLAY PLAN FREE TEXT
DISPLAY PLAN FREE TEXT
Normal rate, regular rhythm.  Heart sounds S1, S2.  No murmurs, rubs or gallops.

## 2019-05-03 NOTE — PROGRESS NOTE ADULT - PROBLEM SELECTOR PLAN 1
Pt with R 1st toe pain and swelling since February s/p injection. Was treated with outpatient Abx, and recently admitted for cellulitis (treated with PO doxycycline). Cellulitis resolved on discharge in early april, but returned and this time present with fluctuance.  Elevated CRP, lactate and ESR WNL. No e/o osteo on CT. Abscess drained in ED by podiatry.  -c/w vanc 1g Q12; vanc trough prior to 4th dose  -c/w zosyn 3.375 q8  -As per podiatry, will get MRI to evaluate for tendon involvement; may require washout in OR tomorrow  -f/u blood cx  -abscess fluid currently growing staph aureus; f/u culture results    #sepsis 2/2 cellulitis  RESOLVING  Pt found to be septic on admission (leukocytosis, tachycardic to 99), 2/2 cellulitis  -reperfusion exam normal  -plan as above

## 2019-05-03 NOTE — DISCHARGE NOTE PROVIDER - PROVIDER TOKENS
PROVIDER:[TOKEN:[[id number]]],PROVIDER:[TOKEN:[[id number]]] PROVIDER:[TOKEN:[[id number]]],PROVIDER:[TOKEN:[[id number]]],PROVIDER:[TOKEN:[[id number]]]

## 2019-05-03 NOTE — DISCHARGE NOTE PROVIDER - NSDCCPCAREPLAN_GEN_ALL_CORE_FT
PRINCIPAL DISCHARGE DIAGNOSIS  Diagnosis: Cellulitis and abscess  Assessment and Plan of Treatment: You came to the hospital because you had an infection on your foot. Podiatry drained the abscess that was above      SECONDARY DISCHARGE DIAGNOSES  Diagnosis: Hyponatremia  Assessment and Plan of Treatment: PRINCIPAL DISCHARGE DIAGNOSIS  Diagnosis: Cellulitis and abscess  Assessment and Plan of Treatment: You came to the hospital because you had an infection on your foot. Podiatry drained the abscess that was above your toe. It was growing a bacteria called stapholococcus aureus. MRI shows that the infection was no spreading to the bone. You are on antibiotics to treat the remaining infection. Please take these as directed.      SECONDARY DISCHARGE DIAGNOSES  Diagnosis: Hyponatremia  Assessment and Plan of Treatment: You have low salt levels. Please follow up with your primary care doctor. PRINCIPAL DISCHARGE DIAGNOSIS  Diagnosis: Cellulitis and abscess  Assessment and Plan of Treatment: You came to the hospital because you had an infection on your foot. Podiatry drained the abscess that was above your toe. It was growing a bacteria called stapholococcus aureus. MRI shows that the infection was no spreading to the bone. You are on antibiotics to treat the remaining infection. Please take these as directed and follow up with Dr. Suggs next week. You can remove the outer dressing but leave seri-strips in place and apply a bandaid over the top. Otherwise, keep the site clean and dry      SECONDARY DISCHARGE DIAGNOSES  Diagnosis: Hyponatremia  Assessment and Plan of Treatment: You have low salt levels. Please follow up with your primary care doctor. PRINCIPAL DISCHARGE DIAGNOSIS  Diagnosis: Cellulitis and abscess  Assessment and Plan of Treatment: You came to the hospital because you had an infection on your foot. Podiatry drained the abscess that was above your toe. It was growing a bacteria called stapholococcus aureus. MRI shows that the infection was no spreading to the bone. You are on antibiotics to treat the remaining infection for 3 weeks. Please take these as directed and follow up with Dr. Suggs next week. You can remove the outer dressing but leave seri-strips in place and apply a bandaid over the top. Otherwise, keep the site clean and dry      SECONDARY DISCHARGE DIAGNOSES  Diagnosis: Hyponatremia  Assessment and Plan of Treatment: You have low salt levels. Please follow up with your primary care doctor.

## 2019-05-06 LAB
CULTURE RESULTS: SIGNIFICANT CHANGE UP
CULTURE RESULTS: SIGNIFICANT CHANGE UP
SPECIMEN SOURCE: SIGNIFICANT CHANGE UP
SPECIMEN SOURCE: SIGNIFICANT CHANGE UP

## 2019-05-07 DIAGNOSIS — L29.9 PRURITUS, UNSPECIFIED: ICD-10-CM

## 2019-05-07 DIAGNOSIS — A41.9 SEPSIS, UNSPECIFIED ORGANISM: ICD-10-CM

## 2019-05-07 DIAGNOSIS — L02.612 CUTANEOUS ABSCESS OF LEFT FOOT: ICD-10-CM

## 2019-05-07 DIAGNOSIS — F41.9 ANXIETY DISORDER, UNSPECIFIED: ICD-10-CM

## 2019-05-07 DIAGNOSIS — E87.1 HYPO-OSMOLALITY AND HYPONATREMIA: ICD-10-CM

## 2019-05-07 DIAGNOSIS — I10 ESSENTIAL (PRIMARY) HYPERTENSION: ICD-10-CM

## 2019-05-07 DIAGNOSIS — L03.031 CELLULITIS OF RIGHT TOE: ICD-10-CM

## 2019-06-17 NOTE — PATIENT PROFILE ADULT - NSSTREETDRUGUSE_GEN_A_NUR
Patient is a 69y old  Female who presents with a chief complaint of Lethargy (16 Jun 2019 07:48)      Jose Velasquez  EM/IM PGY1    SUBJECTIVE / OVERNIGHT EVENTS:    MEDICATIONS  (STANDING):  benztropine 0.5 milliGRAM(s) Oral two times a day  citalopram 20 milliGRAM(s) Oral daily  dextrose 5%. 1000 milliLiter(s) (50 mL/Hr) IV Continuous <Continuous>  dextrose 50% Injectable 12.5 Gram(s) IV Push once  dextrose 50% Injectable 25 Gram(s) IV Push once  dextrose 50% Injectable 25 Gram(s) IV Push once  heparin  Injectable 5000 Unit(s) SubCutaneous every 8 hours  hydrALAZINE 25 milliGRAM(s) Oral every 8 hours  hydrochlorothiazide 25 milliGRAM(s) Oral daily  insulin lispro (HumaLOG) corrective regimen sliding scale   SubCutaneous Before meals and at bedtime  melatonin 3 milliGRAM(s) Oral at bedtime  pantoprazole  Injectable 40 milliGRAM(s) IV Push daily  risperiDONE   Solution 3 milliGRAM(s) Oral two times a day  valproic  acid Syrup 500 milliGRAM(s) Oral three times a day    MEDICATIONS  (PRN):  benzocaine 15 mG/menthol 3.6 mG Lozenge 1 Lozenge Oral every 6 hours PRN Sore Throat  dextrose 40% Gel 15 Gram(s) Oral once PRN Blood Glucose LESS THAN 70 milliGRAM(s)/deciliter  glucagon  Injectable 1 milliGRAM(s) IntraMuscular once PRN Glucose LESS THAN 70 milligrams/deciliter      Vital Signs Last 24 Hrs  T(C): 36.2 (17 Jun 2019 05:09), Max: 36.4 (16 Jun 2019 13:17)  T(F): 97.1 (17 Jun 2019 05:09), Max: 97.5 (16 Jun 2019 13:17)  HR: 67 (17 Jun 2019 05:09) (67 - 75)  BP: 123/53 (17 Jun 2019 05:09) (123/53 - 141/57)  BP(mean): --  RR: 18 (17 Jun 2019 05:09) (18 - 19)  SpO2: 100% (17 Jun 2019 05:09) (100% - 100%)  CAPILLARY BLOOD GLUCOSE      POCT Blood Glucose.: 216 mg/dL (16 Jun 2019 21:23)  POCT Blood Glucose.: 156 mg/dL (16 Jun 2019 17:37)  POCT Blood Glucose.: 248 mg/dL (16 Jun 2019 12:37)  POCT Blood Glucose.: 146 mg/dL (16 Jun 2019 08:33)    I&O's Summary    PHYSICAL EXAM:  General: NAD  HEENT: NC/AT; PERRL, clear conjunctiva  Neck: supple  Respiratory: grossly CTA b/l  Cardiovascular: +S1/S2; RRR  Abdomen: soft, NT/ND; +BS x4  Extremities: WWP, 2+ peripheral pulses b/l; no LE edema  Skin: normal color and turgor; no rash  Neurological: A&Ox1, no focal motor or sensory deficits noted    LABS:                    RADIOLOGY & ADDITIONAL TESTS: Reviewed. Patient is a 69y old  Female who presents with a chief complaint of Lethargy (16 Jun 2019 07:48)      Jose Velasquez  EM/IM PGY1    SUBJECTIVE / OVERNIGHT EVENTS: No acute events overnight. Pt denies any pain or complaints this morning. A&Ox1, smiling and resting comfortably during interview.    MEDICATIONS  (STANDING):  benztropine 0.5 milliGRAM(s) Oral two times a day  citalopram 20 milliGRAM(s) Oral daily  dextrose 5%. 1000 milliLiter(s) (50 mL/Hr) IV Continuous <Continuous>  dextrose 50% Injectable 12.5 Gram(s) IV Push once  dextrose 50% Injectable 25 Gram(s) IV Push once  dextrose 50% Injectable 25 Gram(s) IV Push once  heparin  Injectable 5000 Unit(s) SubCutaneous every 8 hours  hydrALAZINE 25 milliGRAM(s) Oral every 8 hours  hydrochlorothiazide 25 milliGRAM(s) Oral daily  insulin lispro (HumaLOG) corrective regimen sliding scale   SubCutaneous Before meals and at bedtime  melatonin 3 milliGRAM(s) Oral at bedtime  pantoprazole  Injectable 40 milliGRAM(s) IV Push daily  risperiDONE   Solution 3 milliGRAM(s) Oral two times a day  valproic  acid Syrup 500 milliGRAM(s) Oral three times a day    MEDICATIONS  (PRN):  benzocaine 15 mG/menthol 3.6 mG Lozenge 1 Lozenge Oral every 6 hours PRN Sore Throat  dextrose 40% Gel 15 Gram(s) Oral once PRN Blood Glucose LESS THAN 70 milliGRAM(s)/deciliter  glucagon  Injectable 1 milliGRAM(s) IntraMuscular once PRN Glucose LESS THAN 70 milligrams/deciliter      Vital Signs Last 24 Hrs  T(C): 36.2 (17 Jun 2019 05:09), Max: 36.4 (16 Jun 2019 13:17)  T(F): 97.1 (17 Jun 2019 05:09), Max: 97.5 (16 Jun 2019 13:17)  HR: 67 (17 Jun 2019 05:09) (67 - 75)  BP: 123/53 (17 Jun 2019 05:09) (123/53 - 141/57)  BP(mean): --  RR: 18 (17 Jun 2019 05:09) (18 - 19)  SpO2: 100% (17 Jun 2019 05:09) (100% - 100%)  CAPILLARY BLOOD GLUCOSE      POCT Blood Glucose.: 216 mg/dL (16 Jun 2019 21:23)  POCT Blood Glucose.: 156 mg/dL (16 Jun 2019 17:37)  POCT Blood Glucose.: 248 mg/dL (16 Jun 2019 12:37)  POCT Blood Glucose.: 146 mg/dL (16 Jun 2019 08:33)    I&O's Summary    PHYSICAL EXAM:  General: NAD  HEENT: NC/AT; PERRL, clear conjunctiva  Neck: supple  Respiratory: grossly CTA b/l  Cardiovascular: +S1/S2; RRR  Abdomen: soft, NT/ND; +BS x4  Extremities: WWP, 2+ peripheral pulses b/l; no LE edema  Skin: normal color and turgor; no rash  Neurological: A&Ox1, no focal motor or sensory deficits noted    LABS: Reviewed.                RADIOLOGY & ADDITIONAL TESTS: Reviewed. never used

## 2019-10-07 ENCOUNTER — HOSPITAL ENCOUNTER (EMERGENCY)
Facility: HOSPITAL | Age: 59
Discharge: LEFT BEFORE DISCHARGE | End: 2019-10-07
Attending: EMERGENCY MEDICINE
Payer: COMMERCIAL

## 2019-10-07 VITALS
HEART RATE: 97 BPM | SYSTOLIC BLOOD PRESSURE: 137 MMHG | DIASTOLIC BLOOD PRESSURE: 71 MMHG | TEMPERATURE: 98.7 F | OXYGEN SATURATION: 98 % | WEIGHT: 130 LBS | BODY MASS INDEX: 21.66 KG/M2 | RESPIRATION RATE: 16 BRPM | HEIGHT: 65 IN
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
## 2019-10-07 RX ORDER — METOPROLOL TARTRATE 25 MG/1
25 TABLET, FILM COATED ORAL 2 TIMES DAILY
COMMUNITY
End: 2022-03-24 | Stop reason: ENTERED-IN-ERROR

## 2019-10-07 RX ORDER — CLONAZEPAM 0.5 MG/1
0.5 TABLET ORAL CONTINUOUS PRN
COMMUNITY

## 2019-10-07 RX ORDER — TRAZODONE HYDROCHLORIDE 50 MG/1
150 TABLET ORAL NIGHTLY
COMMUNITY

## 2019-10-07 ASSESSMENT — ENCOUNTER SYMPTOMS
ABDOMINAL PAIN: 0
VOMITING: 0
DIARRHEA: 0
COUGH: 0
COLOR CHANGE: 0
SHORTNESS OF BREATH: 0
SORE THROAT: 0
FEVER: 0
WEAKNESS: 0
NUMBNESS: 0

## 2019-10-08 NOTE — DISCHARGE INSTRUCTIONS
You are leaving the hospital AGAINST MEDICAL ADVICE.  We have recommended that you get a CT scan of your head and neck and an x-ray of your chest to make sure they do not have any life-threatening injuries.  If you change your mind and would like complete evaluation please return to the emergency department immediately.
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
## 2019-11-07 ENCOUNTER — APPOINTMENT (OUTPATIENT)
Dept: LAB | Facility: HOSPITAL | Age: 59
End: 2019-11-07
Attending: PSYCHIATRY & NEUROLOGY
Payer: COMMERCIAL

## 2019-11-07 ENCOUNTER — TRANSCRIBE ORDERS (OUTPATIENT)
Dept: LAB | Facility: HOSPITAL | Age: 59
End: 2019-11-07

## 2019-11-07 DIAGNOSIS — F33.1 MAJOR DEPRESSIVE DISORDER, RECURRENT, MODERATE (CMS/HCC): ICD-10-CM

## 2019-11-07 DIAGNOSIS — F33.1 MAJOR DEPRESSIVE DISORDER, RECURRENT, MODERATE (CMS/HCC): Primary | ICD-10-CM

## 2019-11-07 LAB
ALBUMIN SERPL-MCNC: 4.1 G/DL (ref 3.4–5)
ALP SERPL-CCNC: 52 IU/L (ref 35–126)
ALT SERPL-CCNC: 20 IU/L (ref 11–54)
ANION GAP SERPL CALC-SCNC: 9 MEQ/L (ref 3–15)
AST SERPL-CCNC: 23 IU/L (ref 15–41)
BASOPHILS # BLD: 0.03 K/UL (ref 0.01–0.1)
BASOPHILS NFR BLD: 0.8 %
BILIRUB DIRECT SERPL-MCNC: 0.2 MG/DL
BILIRUB SERPL-MCNC: 0.7 MG/DL (ref 0.3–1.2)
BUN SERPL-MCNC: 9 MG/DL (ref 8–20)
CALCIUM SERPL-MCNC: 9.4 MG/DL (ref 8.9–10.3)
CHLORIDE SERPL-SCNC: 100 MEQ/L (ref 98–109)
CO2 SERPL-SCNC: 25 MEQ/L (ref 22–32)
CREAT SERPL-MCNC: 0.9 MG/DL
DIFFERENTIAL METHOD BLD: ABNORMAL
EOSINOPHIL # BLD: 0.09 K/UL (ref 0.04–0.36)
EOSINOPHIL NFR BLD: 2.3 %
ERYTHROCYTE [DISTWIDTH] IN BLOOD BY AUTOMATED COUNT: 12.1 % (ref 11.7–14.4)
GFR SERPL CREATININE-BSD FRML MDRD: >60 ML/MIN/1.73M*2
GGT SERPL-CCNC: 22 IU/L (ref 7–50)
GLUCOSE SERPL-MCNC: 90 MG/DL (ref 70–99)
HCT VFR BLDCO AUTO: 36 %
HGB BLD-MCNC: 12.7 G/DL (ref 11.8–15.7)
IMM GRANULOCYTES # BLD AUTO: 0.01 K/UL (ref 0–0.08)
IMM GRANULOCYTES NFR BLD AUTO: 0.3 %
LYMPHOCYTES # BLD: 1.13 K/UL (ref 1.2–3.5)
LYMPHOCYTES NFR BLD: 28.7 %
MCH RBC QN AUTO: 33.2 PG (ref 28–33.2)
MCHC RBC AUTO-ENTMCNC: 35.3 G/DL (ref 32.2–35.5)
MCV RBC AUTO: 94.2 FL (ref 83–98)
MONOCYTES # BLD: 0.46 K/UL (ref 0.28–0.8)
MONOCYTES NFR BLD: 11.7 %
NEUTROPHILS # BLD: 2.22 K/UL (ref 1.7–7)
NEUTS SEG NFR BLD: 56.2 %
NRBC BLD-RTO: 0 %
PDW BLD AUTO: 11.8 FL (ref 9.4–12.3)
PLAT MORPH BLD: NORMAL
PLATELET # BLD AUTO: 182 K/UL
PLATELET # BLD EST: ABNORMAL 10*3/UL
POTASSIUM SERPL-SCNC: 4.3 MEQ/L (ref 3.6–5.1)
PROT SERPL-MCNC: 6.1 G/DL (ref 6–8.2)
RBC # BLD AUTO: 3.82 M/UL (ref 3.93–5.22)
RBC MORPH BLD: NORMAL
SODIUM SERPL-SCNC: 134 MEQ/L (ref 136–144)
TSH SERPL DL<=0.05 MIU/L-ACNC: 1.27 MIU/L (ref 0.34–5.6)
WBC # BLD AUTO: 3.94 K/UL

## 2019-11-07 PROCEDURE — 80053 COMPREHEN METABOLIC PANEL: CPT

## 2019-11-07 PROCEDURE — 82652 VIT D 1 25-DIHYDROXY: CPT

## 2019-11-07 PROCEDURE — 85025 COMPLETE CBC W/AUTO DIFF WBC: CPT

## 2019-11-07 PROCEDURE — 82977 ASSAY OF GGT: CPT

## 2019-11-07 PROCEDURE — 36415 COLL VENOUS BLD VENIPUNCTURE: CPT

## 2019-11-07 PROCEDURE — 84443 ASSAY THYROID STIM HORMONE: CPT

## 2019-11-11 LAB
1,25(OH)2D SERPL-MCNC: 33 PG/ML (ref 18–72)
1,25(OH)2D2 SERPL-MCNC: <8 PG/ML
1,25(OH)2D3 SERPL-MCNC: 33 PG/ML

## 2020-07-13 NOTE — ED PROVIDER NOTE - NS ED MD DISPO SPECIAL CONSIDERATION1
HEARING AID DROP-OFF    Patient Name:  Katy Islas    Patient Age:   30 year old    :  1990    Background:   The patient dropped off her right hearing aid because it was not working well. She reports that it makes a static sound and then does not work at all. I spoke with the patient and we mostly communicated by writing.      SIDE: Right   MAKE: Phonak   MODEL: Deena V50-UP   S/N: [id number]   WARRANTY: 2020    Procedures:   A biological listening check revealed minimal sound from the hearing aid. There was still minimal sound with the earmold and tubing removed, but good sound when the ear hook was removed. Visual inspection revealed moisture in the ear hook filter. The ear hook was replaced and the earmold was re-tubed and fit to the patient's ear. The patient reported greatly improved sound.    Plan:   The patient will return as needed for hearing aid services.      Michelle Harris, CCC-A  Licensed Audiologist  2020    
None

## 2020-10-18 NOTE — CONSULT NOTE ADULT - PROBLEM SELECTOR RECOMMENDATION 2
7) Continue home meds
I personally performed the service described in the documentation recorded by the scribe in my presence, and it accurately and completely records my words and actions.

## 2020-10-19 NOTE — ED PROVIDER NOTE - PSH
Patient calling for sooner appointment. Per Dr. Rian Arevalo.  Call [phone] H/O breast augmentation

## 2021-02-11 NOTE — H&P ADULT - PROBLEM SELECTOR PLAN 8
37
Transition of Care   1) PCP Contacted on Admission: (Y/N) --> Name & Phone #: Dr. Daly Foote [phone]  2) Date of Contact with PCP:  3) PCP Contacted at Discharge: (Y/N)  4) Summary of Handoff Given to PCP:   5) Post-Discharge Appointment Date and Location:
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
## 2021-11-19 ENCOUNTER — IMMUNIZATION (OUTPATIENT)
Dept: IMMUNIZATION | Facility: CLINIC | Age: 61
End: 2021-11-19
Payer: COMMERCIAL

## 2021-11-19 PROCEDURE — 0064A SARS-COV-2 VACCINE BOOSTER MODERNA: CPT | Performed by: FAMILY MEDICINE

## 2021-11-19 PROCEDURE — 91306 SARS-COV-2 VACCINE BOOSTER MODERNA: CPT | Performed by: FAMILY MEDICINE

## 2022-03-24 ENCOUNTER — APPOINTMENT (OUTPATIENT)
Dept: PREADMISSION TESTING | Facility: HOSPITAL | Age: 62
End: 2022-03-24
Payer: COMMERCIAL

## 2022-03-24 VITALS — HEIGHT: 64 IN | WEIGHT: 124 LBS | BODY MASS INDEX: 21.17 KG/M2

## 2022-03-24 NOTE — PRE-PROCEDURE INSTRUCTIONS
1. We will call you between 3 pm and 7 pm on 3/31/22 to inform you of arrival time for your procedure. If you do not hear by 6PM, please call [phone] for arrival time.     2. Please arrive through the Main Entrance of the [facility] (across from the parking garage) and report to the Surgery Registration Desk on the day of your procedure.    3. Please follow the following fasting guidelines:     No solid food EIGHT HOURS prior to surgery.  Unlimited clear liquids, meaning water or PLAIN black coffee WITHOUT any milk, cream, sugar, or sweetener are permitted up to TWO HOURS prior to arrival at the hospital.    4. Early on the morning of the procedure please take your usual dose of the listed medications with a sip of water:    Klonopin and tylenol if needed    5. Other Instructions: You may brush your teeth the morning of the procedure. Rinse and spit, do not swallow.  Bring a list of your medications with dosages.  Use surgical wash as directed.     6. If you develop a cold, cough, fever, rash, or other symptom prior to the day of the procedure, please report it to your physician immediately.    7. If you need to cancel the procedure for any reason, please contact your physician.    8. Make arrangements to have someone drive you home from the procedure. If you have not arranged for transportation home, your surgery may be cancelled.     9. You may not take public transportation unless accompanied by a responsible person.    10. You may not drive a car or operate complex or potentially dangerous machinery for 24 hours following anesthesia and/or sedation.    11.  If it is medically necessary for you to have a longer stay, you will be informed as soon as the decision is made.    12. Only bring essential items to the hospital.  Do not wear or bring anything of value to the hospital including jewelry of any kind, money, or wallet. Do not wear make-up or contact lenses.  DO NOT BRING MEDICATIONS FROM HOME unless  instructed to do so. DO bring your hearing aids, glasses, and a case    13. No lotion, creams, powders, or oils on skin the morning of procedure     14. Dress in comfortable clothes.    15.  If instructed, please bring a copy of your Advanced Directive (Living Will/Durable Power of ) on the day of your procedure.     16. Patients need to quarantine from the time of PAT COVID test to day of surgery, regardless of COVID vaccine status.      17. Ensuring your safety at all times is a very important part of our [facility] Culture of Safety. After having surgery and sedation, you are at risk for falling and balance issues. Although you may feel awake, the effects of the medication can last up to 24 hours after anesthesia. If you need to use the bathroom during your recovery period, nursing staff will escort you there and stay with you to ensure your safety.    18. Refrain from drinking alcohol and smoking cigarettes for 24 hours prior to surgery.    19. Shower with antibacterial soap (Dial) the night before and morning of your procedure.  If your procedure indicates the need for CHG antiseptic wash (Bactoshield or Hibiclens), please use this instead and follow instructions as discussed at the time of your Pre-Admission Testing phone interview or visit.    Above instructions reviewed with patient and patient acknowledges understanding.    Form explained by: Siobhan Caro RN

## 2022-03-25 ENCOUNTER — ANESTHESIA EVENT (OUTPATIENT)
Dept: OPERATING ROOM | Facility: HOSPITAL | Age: 62
Setting detail: HOSPITAL OUTPATIENT SURGERY
End: 2022-03-25
Payer: COMMERCIAL

## 2022-03-29 ENCOUNTER — TRANSCRIBE ORDERS (OUTPATIENT)
Dept: REGISTRATION | Facility: HOSPITAL | Age: 62
End: 2022-03-29

## 2022-03-29 ENCOUNTER — APPOINTMENT (OUTPATIENT)
Dept: PREADMISSION TESTING | Facility: HOSPITAL | Age: 62
End: 2022-03-29
Attending: PODIATRIST
Payer: COMMERCIAL

## 2022-03-29 DIAGNOSIS — M19.071 PRIMARY OSTEOARTHRITIS, RIGHT ANKLE AND FOOT: ICD-10-CM

## 2022-03-29 DIAGNOSIS — Z11.59 ENCOUNTER FOR SCREENING FOR OTHER VIRAL DISEASES: ICD-10-CM

## 2022-03-29 DIAGNOSIS — M20.21 HALLUX RIGIDUS, RIGHT FOOT: ICD-10-CM

## 2022-03-29 DIAGNOSIS — Z11.59 ENCOUNTER FOR SCREENING FOR OTHER VIRAL DISEASES: Primary | ICD-10-CM

## 2022-03-29 LAB — SARS-COV-2 RNA RESP QL NAA+PROBE: NEGATIVE

## 2022-03-29 PROCEDURE — U0003 INFECTIOUS AGENT DETECTION BY NUCLEIC ACID (DNA OR RNA); SEVERE ACUTE RESPIRATORY SYNDROME CORONAVIRUS 2 (SARS-COV-2) (CORONAVIRUS DISEASE [COVID-19]), AMPLIFIED PROBE TECHNIQUE, MAKING USE OF HIGH THROUGHPUT TECHNOLOGIES AS DESCRIBED BY CMS-2020-01-R: HCPCS

## 2022-03-31 NOTE — ANESTHESIA PREPROCEDURE EVALUATION
Relevant Problems   No relevant active problems     Pituitary tumor-now dissipated per pt  Depression  Recovering alcoholic-sober x a few years        Anesthesia ROS/MED HX      Cardiovascular   ECG reviewed       Past Surgical History:   Procedure Laterality Date   • AUGMENTATION MAMMAPLASTY         Physical Exam    Airway   Mallampati: II  Cardiovascular - normal   Rhythm: regular   Rate: normalPulmonary - normal   clear to auscultation        Anesthesia Plan    Plan: general    Technique: general LMA   ASA 2

## 2022-04-01 ENCOUNTER — HOSPITAL ENCOUNTER (OUTPATIENT)
Facility: HOSPITAL | Age: 62
Setting detail: HOSPITAL OUTPATIENT SURGERY
Discharge: HOME | End: 2022-04-01
Attending: PODIATRIST | Admitting: PODIATRIST
Payer: COMMERCIAL

## 2022-04-01 ENCOUNTER — ANESTHESIA (OUTPATIENT)
Dept: OPERATING ROOM | Facility: HOSPITAL | Age: 62
Setting detail: HOSPITAL OUTPATIENT SURGERY
End: 2022-04-01
Payer: COMMERCIAL

## 2022-04-01 VITALS
RESPIRATION RATE: 20 BRPM | HEIGHT: 64 IN | SYSTOLIC BLOOD PRESSURE: 133 MMHG | TEMPERATURE: 97.1 F | DIASTOLIC BLOOD PRESSURE: 63 MMHG | BODY MASS INDEX: 21 KG/M2 | HEART RATE: 77 BPM | OXYGEN SATURATION: 97 % | WEIGHT: 123 LBS

## 2022-04-01 DIAGNOSIS — M20.21 HALLUX RIGIDUS OF RIGHT FOOT: ICD-10-CM

## 2022-04-01 DIAGNOSIS — M19.071 ARTHRITIS OF RIGHT ANKLE: ICD-10-CM

## 2022-04-01 PROCEDURE — 25000000 HC PHARMACY GENERAL: Performed by: PODIATRIST

## 2022-04-01 PROCEDURE — 36000003 HC OR LEVEL 3 INITIAL 30MIN: Performed by: PODIATRIST

## 2022-04-01 PROCEDURE — 88304 TISSUE EXAM BY PATHOLOGIST: CPT | Performed by: PODIATRIST

## 2022-04-01 PROCEDURE — 36000013 HC OR LEVEL 3 EA ADDL MIN: Performed by: PODIATRIST

## 2022-04-01 PROCEDURE — 63600000 HC DRUGS/DETAIL CODE: Performed by: ANESTHESIOLOGY

## 2022-04-01 PROCEDURE — C1776 JOINT DEVICE (IMPLANTABLE): HCPCS | Performed by: PODIATRIST

## 2022-04-01 PROCEDURE — 71000001 HC PACU PHASE 1 INITIAL 30MIN: Performed by: PODIATRIST

## 2022-04-01 PROCEDURE — 25000000 HC PHARMACY GENERAL: Performed by: NURSE ANESTHETIST, CERTIFIED REGISTERED

## 2022-04-01 PROCEDURE — 71000011 HC PACU PHASE 1 EA ADDL MIN: Performed by: PODIATRIST

## 2022-04-01 PROCEDURE — 71000002 HC PACU PHASE 2 INITIAL 30MIN: Performed by: PODIATRIST

## 2022-04-01 PROCEDURE — 63600000 HC DRUGS/DETAIL CODE: Performed by: NURSE ANESTHETIST, CERTIFIED REGISTERED

## 2022-04-01 PROCEDURE — 25800000 HC PHARMACY IV SOLUTIONS: Performed by: PODIATRIST

## 2022-04-01 PROCEDURE — 37000001 HC ANESTHESIA GENERAL: Performed by: PODIATRIST

## 2022-04-01 PROCEDURE — 71000012 HC PACU PHASE 2 EA ADDL MIN: Performed by: PODIATRIST

## 2022-04-01 PROCEDURE — 27200000 HC STERILE SUPPLY: Performed by: PODIATRIST

## 2022-04-01 PROCEDURE — 0SRM0JZ REPLACEMENT OF RIGHT METATARSAL-PHALANGEAL JOINT WITH SYNTHETIC SUBSTITUTE, OPEN APPROACH: ICD-10-PCS | Performed by: PODIATRIST

## 2022-04-01 DEVICE — IMPLANTABLE DEVICE: Type: IMPLANTABLE DEVICE | Site: FIRST TOE | Status: FUNCTIONAL

## 2022-04-01 RX ORDER — GLYCOPYRROLATE 0.6MG/3ML
SYRINGE (ML) INTRAVENOUS AS NEEDED
Status: DISCONTINUED | OUTPATIENT
Start: 2022-04-01 | End: 2022-04-01 | Stop reason: SURG

## 2022-04-01 RX ORDER — MIDAZOLAM HYDROCHLORIDE 2 MG/2ML
INJECTION, SOLUTION INTRAMUSCULAR; INTRAVENOUS AS NEEDED
Status: DISCONTINUED | OUTPATIENT
Start: 2022-04-01 | End: 2022-04-01 | Stop reason: SURG

## 2022-04-01 RX ORDER — ONDANSETRON HYDROCHLORIDE 2 MG/ML
4 INJECTION, SOLUTION INTRAVENOUS
Status: DISCONTINUED | OUTPATIENT
Start: 2022-04-01 | End: 2022-04-01 | Stop reason: HOSPADM

## 2022-04-01 RX ORDER — CEFAZOLIN SODIUM/WATER 2 G/20 ML
2 SYRINGE (ML) INTRAVENOUS ONCE
Status: COMPLETED | OUTPATIENT
Start: 2022-04-01 | End: 2022-04-01

## 2022-04-01 RX ORDER — FENTANYL CITRATE 50 UG/ML
INJECTION, SOLUTION INTRAMUSCULAR; INTRAVENOUS AS NEEDED
Status: DISCONTINUED | OUTPATIENT
Start: 2022-04-01 | End: 2022-04-01 | Stop reason: SURG

## 2022-04-01 RX ORDER — ACETAMINOPHEN AND CODEINE PHOSPHATE 300; 30 MG/1; MG/1
1 TABLET ORAL EVERY 4 HOURS PRN
Status: DISCONTINUED | OUTPATIENT
Start: 2022-04-01 | End: 2022-04-01 | Stop reason: HOSPADM

## 2022-04-01 RX ORDER — SODIUM CHLORIDE 9 MG/ML
INJECTION, SOLUTION INTRAVENOUS CONTINUOUS
Status: DISCONTINUED | OUTPATIENT
Start: 2022-04-01 | End: 2022-04-01

## 2022-04-01 RX ORDER — TRAMADOL HYDROCHLORIDE 50 MG/1
50 TABLET ORAL EVERY 6 HOURS PRN
Status: CANCELLED | OUTPATIENT
Start: 2022-04-01

## 2022-04-01 RX ORDER — HYDROMORPHONE HYDROCHLORIDE 1 MG/ML
0.5 INJECTION, SOLUTION INTRAMUSCULAR; INTRAVENOUS; SUBCUTANEOUS
Status: DISCONTINUED | OUTPATIENT
Start: 2022-04-01 | End: 2022-04-01 | Stop reason: HOSPADM

## 2022-04-01 RX ORDER — EPHEDRINE SULFATE/0.9% NACL/PF 50 MG/5 ML
SYRINGE (ML) INTRAVENOUS AS NEEDED
Status: DISCONTINUED | OUTPATIENT
Start: 2022-04-01 | End: 2022-04-01 | Stop reason: SURG

## 2022-04-01 RX ORDER — ONDANSETRON HYDROCHLORIDE 2 MG/ML
INJECTION, SOLUTION INTRAVENOUS AS NEEDED
Status: DISCONTINUED | OUTPATIENT
Start: 2022-04-01 | End: 2022-04-01 | Stop reason: SURG

## 2022-04-01 RX ORDER — PHENYLEPHRINE HYDROCHLORIDE 10 MG/ML
INJECTION INTRAVENOUS AS NEEDED
Status: DISCONTINUED | OUTPATIENT
Start: 2022-04-01 | End: 2022-04-01 | Stop reason: SURG

## 2022-04-01 RX ORDER — PROPOFOL 10 MG/ML
INJECTION, EMULSION INTRAVENOUS AS NEEDED
Status: DISCONTINUED | OUTPATIENT
Start: 2022-04-01 | End: 2022-04-01 | Stop reason: SURG

## 2022-04-01 RX ORDER — DEXAMETHASONE SODIUM PHOSPHATE 4 MG/ML
INJECTION, SOLUTION INTRA-ARTICULAR; INTRALESIONAL; INTRAMUSCULAR; INTRAVENOUS; SOFT TISSUE AS NEEDED
Status: DISCONTINUED | OUTPATIENT
Start: 2022-04-01 | End: 2022-04-01 | Stop reason: SURG

## 2022-04-01 RX ORDER — FENTANYL CITRATE 50 UG/ML
50 INJECTION, SOLUTION INTRAMUSCULAR; INTRAVENOUS
Status: DISCONTINUED | OUTPATIENT
Start: 2022-04-01 | End: 2022-04-01 | Stop reason: HOSPADM

## 2022-04-01 RX ORDER — BUPIVACAINE HYDROCHLORIDE 5 MG/ML
INJECTION, SOLUTION EPIDURAL; INTRACAUDAL
Status: DISCONTINUED | OUTPATIENT
Start: 2022-04-01 | End: 2022-04-01 | Stop reason: HOSPADM

## 2022-04-01 RX ADMIN — DEXAMETHASONE SODIUM PHOSPHATE 4 MG: 4 INJECTION, SOLUTION INTRA-ARTICULAR; INTRALESIONAL; INTRAMUSCULAR; INTRAVENOUS; SOFT TISSUE at 07:38

## 2022-04-01 RX ADMIN — FENTANYL CITRATE 50 MCG: 50 INJECTION INTRAMUSCULAR; INTRAVENOUS at 09:12

## 2022-04-01 RX ADMIN — Medication 10 MG: at 07:48

## 2022-04-01 RX ADMIN — Medication 2 G: at 07:40

## 2022-04-01 RX ADMIN — PROPOFOL 200 MG: 10 INJECTION, EMULSION INTRAVENOUS at 07:40

## 2022-04-01 RX ADMIN — SODIUM CHLORIDE: 9 INJECTION, SOLUTION INTRAVENOUS at 07:01

## 2022-04-01 RX ADMIN — FENTANYL CITRATE 50 MCG: 50 INJECTION INTRAMUSCULAR; INTRAVENOUS at 09:31

## 2022-04-01 RX ADMIN — ONDANSETRON 4 MG: 2 INJECTION INTRAMUSCULAR; INTRAVENOUS at 09:31

## 2022-04-01 RX ADMIN — FENTANYL CITRATE 50 MCG: 50 INJECTION INTRAMUSCULAR; INTRAVENOUS at 09:59

## 2022-04-01 RX ADMIN — Medication 10 MG: at 08:12

## 2022-04-01 RX ADMIN — MIDAZOLAM HYDROCHLORIDE 2 MG: 1 INJECTION, SOLUTION INTRAMUSCULAR; INTRAVENOUS at 07:27

## 2022-04-01 RX ADMIN — FENTANYL CITRATE 50 MCG: 50 INJECTION INTRAMUSCULAR; INTRAVENOUS at 10:02

## 2022-04-01 RX ADMIN — ONDANSETRON HYDROCHLORIDE 4 MG: 2 SOLUTION INTRAMUSCULAR; INTRAVENOUS at 11:22

## 2022-04-01 RX ADMIN — FENTANYL CITRATE 100 MCG: 50 INJECTION INTRAMUSCULAR; INTRAVENOUS at 07:38

## 2022-04-01 RX ADMIN — PHENYLEPHRINE HYDROCHLORIDE 100 MCG: 10 INJECTION INTRAVENOUS at 07:57

## 2022-04-01 RX ADMIN — GLYCOPYRROLATE 0.1 MG: 0.2 INJECTION, SOLUTION INTRAMUSCULAR; INTRAVITREAL at 07:48

## 2022-04-01 NOTE — OP NOTE
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    Estimated Blood Loss: No blood loss documented.    Specimens: Right 1st MTPJ bone and arthritic cartilage sent to pathology.    Implants:   Implant Name Type Inv. Item Serial No.  Lot No. LRB No. Used Action   regular flexspan flexible hinge toe    [employer] [id number] Right 1 Implanted       Injectables: none         Complications:  None; patient tolerated the procedure well.           Disposition: PACU - hemodynamically stable.           Condition: stable    Operative Note:   Pacheco Breaux  presents to [facility] for surgical intervention of their right foot. The patient has failed most conservative modalities and has opted for surgical intervention at this time. In pre-op the patients’ vital signs are stable and neurovascular status is intact.     The patient was transferred to the operating room and placed on the operating room table in the supine position.  The correct surgical site was identified which is the right side. A well padded pneumatic tourniquet was then placed around the right thigh but not elevated at this time. Once anesthesia was achieved, the above mentioned blocked was administered. The foot and ankle were prepped and draped in the usual aseptic technique and a timeout was performed identifying the patient side as well as procedure. An  Eshmarch bandage was then wrapped around the foot and ankle and then the tourniquet was then elevated to 350 mmHg.    1.Right 1st MTPJ implant application  Attention was drawn to the right 1st metatarsophalangeal joint. A dorsal incision was made at the level of the MPJ, approximately 5 cm in length, medial to the extensor hallucis longus tendon, down through anatomical layers, making sure to maintain good hemostasis and preserve the neurovascular bundle. The capsule was free of its soft-tissue attachments medially and laterally. A #15 blade was used to free the head of the first metatarsal and base of the proximal phalanx from the capsular structures. A large osteophyte is noted dorsal to the 1st MTPJ at this time. A sagittal saw is then used to resect the osteophyte and the osteophyte is placed in a sterile container for specimen collection later. A bone rongeur is then used to remove all osseous irregularities at the head of the 1st metatarsal and base of the proximal phalanx. Extensive erosive changes noted to the cartilaginous structures at the base of the proximal phalanx and head of the first metatarsal. A sagittal saw is then used to resect a portion of the head of the first metatarsal and base of the proximal phalanx. Care was taken to angulate the blade to ensure the hallux will sit in a rectus position. The resected bones from the base of the proximal phalanx, head of the first metatarsal, as well as the osteophyte were placed in a sterile container with formalin and sent to pathology. The intramedullary canal of the head of the first metatarsal and base of the proximal phalanx were drilled to appropriate depth and width to accommodate for the stem of the implant. Trial implants were used for sizing, and a size 6 implant was selected. The implant was placed into the joint per 's recommendations and guidelines. The hallux was put through range of motion, and excellent dorsiflexion and  plantarflexion of the joint is noted at this time. The implant is sitting flushed against the bones. The area was copiously irrigated with normal sterile saline.  Capsular closure was achieved using 3-0 vicryl, subcutaneous closure was achieved using 4-0 vicryl, and the skin was re-approximated using 4-0 Monocryl.  The surgical site was dressed with betadine soaked adaptic, gauze, kerlix, ACE bandage.    The patient tolerated the procedure well with all vital signs stable and neurovascular status intact. Once aroused from anesthesia the patient was transferred from the operating room to PACU and discharged home per protocol.    Patient is instructed to be weight bearing as tolerated in a surgical shoe and to follow-up with Dr. Barboza  in 4 days after the procedure. Patient was also given a script for Tylenol #3.    Moise Barboza DPM  Phone Number: [phone]

## 2022-04-01 NOTE — DISCHARGE INSTRUCTIONS
Dr. Osvaldo Aguayo Office        [facility]            [phone]    Post Operative Instructions    Keep foot elevated as much as possible for the next 4 days.    Keep bandage clean and dry.  Do not remove unless instructed to do so by your Physician.  If bandage becomes soiled or wet call the office immediately.    Apply ice pack to for roughly 30 minutes 3-5 times a day as needed for pain    Take mediation as prescribed:  Codeine: as needed for pain-take as prescribed    Bear weight on the area only as advised:  full weight bearing as tolerated to R foot in a surgical shoe    Should you have excessive bleeding (through the bandage) or excessive discomfort, please call the office immediately.    Please call the office number above: to set up your 1st follow up appointment in 4 [facility].  Dr. Avery Barboza phone number: [phone]

## 2022-04-01 NOTE — ANESTHESIA POSTPROCEDURE EVALUATION
Patient: Pcaheco Breaux    Procedure Summary     Date: 04/01/22 Room / Location:  OR 2 / PH OR    Anesthesia Start: 0730 Anesthesia Stop: 1017    Procedure: Total Joint Implant 1st Right MPJ (Right ) Diagnosis:       Hallux rigidus of right foot      Arthritis of right ankle      (Hallux Rigidus M20.21)      (Arthritis 1st MPJ M19.071)    Surgeons: Moise Barboza DPM Responsible Provider: Kassandra Paulson MD    Anesthesia Type: general ASA Status: 2          Anesthesia Type: general  PACU Vitals  4/1/2022 1008 - 4/1/2022 1030      4/1/2022  1015             BP: 118/58    Pulse: 86    Resp: 48            Anesthesia Post Evaluation    Pain management: adequate  Patient participation: complete - patient participated  Level of consciousness: awake and alert  Cardiovascular status: acceptable  Airway Patency: adequate  Respiratory status: acceptable  Hydration status: acceptable  Anesthetic complications: no

## 2022-04-01 NOTE — DISCHARGE SUMMARY
-Patient presented to hospital for outpatient podiatric procedure. All pre operative testing and clearance was obtained. All risks and benefits of procedure explained to patient and consent signed.  -After successful podiatric procedure the patient was discharge to home per PACU protocol with discharge instructions and post op pain medication.  -Patient is to follow up in 7-14 days

## 2022-04-01 NOTE — ANESTHESIOLOGIST PRE-PROCEDURE ATTESTATION
Pre-Procedure Patient Identification:  I am the Primary Anesthesiologist and have identified the patient on 04/01/22 at 6:45 AM.   I have confirmed the procedure(s) will be performed by the following surgeon/proceduralist Moise Barboza DPM.

## 2022-04-04 LAB
CASE RPRT: NORMAL
CLINICAL INFO: NORMAL
PATH REPORT.FINAL DX SPEC: NORMAL
PATH REPORT.GROSS SPEC: NORMAL

## 2022-05-24 NOTE — ANESTHESIA PROCEDURE NOTES
Airway  Urgency: elective    Start Time: 4/1/2022 7:42 AM  Stop Time: 4/1/2022 7:42 AM    Airway not difficult    General Information and Staff    Patient location during procedure: OR  Anesthesiologist: Kulwant Ruiz CRNA  Performed: resident/CRNA     Indications and Patient Condition  Indications for airway management: anesthesia  Sedation level: deep  Preoxygenated: yes  Mask difficulty assessment: 0 - not attempted    Final Airway Details  Final airway type: supraglottic airway      Successful airway: iGel  Size 3     Number of attempts at approach: 1  Ventilation between attempts: none  Number of other approaches attempted: 0  Atraumatic airway insertion        Medications Administered - 4/1/2022 7:42 AM         
[phone]

## 2022-09-03 NOTE — ED PROVIDER NOTE - NEURO NEGATIVE STATEMENT, MLM
+rib/abdomen no loss of consciousness, no gait abnormality, no headache, no sensory deficits, and no weakness.

## 2022-10-11 NOTE — ED ADULT NURSE NOTE - CAS TRG GENERAL AIRWAY, MLM
[Telehealth (audio & video) - Individual/Group] : This visit was provided via telehealth using real-time 2-way audio visual technology. [Other Location: e.g. Home (Enter Location, City,State)___] : The provider was located at [unfilled]. [Home] : The patient, [unfilled], was located at home, [unfilled], at the time of the visit. [Verbal consent obtained from patient/other participant(s)] : Verbal consent for telehealth/telephonic services obtained from patient/other participant(s) [Patient] : Patient [FreeTextEntry1] : Pt arrived for weekly psychotherapy appointment [place]

## 2024-03-11 NOTE — ED ADULT TRIAGE NOTE - CHIEF COMPLAINT QUOTE
Patient was sent by [facility]D for low sodium level , went to [facility] yesterday for her rt foot infected toe , had blood work done . Denies any fever nor chills . [FreeTextEntry1] : Pt sees Dr. Carolyn Davenport as her PCP at [facility]. Pt denied any significant medical problems or ongoing medications taken.

## 2024-10-13 NOTE — PROGRESS NOTE ADULT - PROBLEM SELECTOR PLAN 8
Transition of Care   1) PCP Contacted on Admission: (Y/N) --> Name & Phone #: Dr. Daly Foote [phone]  2) Date of Contact with PCP:  3) PCP Contacted at Discharge: (Y/N)  4) Summary of Handoff Given to PCP:   5) Post-Discharge Appointment Date and Location: Transition of Care   1) PCP Contacted on Admission: (Y/N) --> Name & Phone #: Dr. Daly Foote [phone]  2) Date of Contact with PCP: 5/2/19  3) PCP Contacted at Discharge: (Y/N)  4) Summary of Handoff Given to PCP:   5) Post-Discharge Appointment Date and Location: Improved
